# Patient Record
Sex: FEMALE | Race: WHITE | HISPANIC OR LATINO | Employment: FULL TIME | URBAN - METROPOLITAN AREA
[De-identification: names, ages, dates, MRNs, and addresses within clinical notes are randomized per-mention and may not be internally consistent; named-entity substitution may affect disease eponyms.]

---
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
## 2023-01-15 ENCOUNTER — HOSPITAL ENCOUNTER (EMERGENCY)
Facility: HOSPITAL | Age: 18
Discharge: HOME | End: 2023-01-15
Attending: EMERGENCY MEDICINE | Admitting: EMERGENCY MEDICINE
Payer: COMMERCIAL

## 2023-01-15 ENCOUNTER — APPOINTMENT (OUTPATIENT)
Dept: RADIOLOGY | Facility: HOSPITAL | Age: 18
End: 2023-01-15
Attending: EMERGENCY MEDICINE
Payer: COMMERCIAL

## 2023-01-15 VITALS
TEMPERATURE: 99.8 F | RESPIRATION RATE: 16 BRPM | HEIGHT: 64 IN | WEIGHT: 210 LBS | BODY MASS INDEX: 35.85 KG/M2 | OXYGEN SATURATION: 97 % | DIASTOLIC BLOOD PRESSURE: 70 MMHG | SYSTOLIC BLOOD PRESSURE: 136 MMHG | HEART RATE: 100 BPM

## 2023-01-15 DIAGNOSIS — Z77.9 EXPOSURE TO RESPIRATORY IRRITANT: ICD-10-CM

## 2023-01-15 DIAGNOSIS — Z77.098 ACCIDENTAL EXPOSURE TO BLEACH: ICD-10-CM

## 2023-01-15 PROBLEM — K76.0 NONALCOHOLIC FATTY LIVER DISEASE: Status: ACTIVE | Noted: 2021-09-03

## 2023-01-15 PROBLEM — K76.0 STEATOSIS OF LIVER: Status: ACTIVE | Noted: 2023-01-15

## 2023-01-15 PROCEDURE — 999N000157 HC STATISTIC RCP TIME EA 10 MIN

## 2023-01-15 PROCEDURE — 71046 X-RAY EXAM CHEST 2 VIEWS: CPT

## 2023-01-15 PROCEDURE — 94640 AIRWAY INHALATION TREATMENT: CPT

## 2023-01-15 PROCEDURE — 99283 EMERGENCY DEPT VISIT LOW MDM: CPT | Mod: 25

## 2023-01-15 PROCEDURE — 250N000009 HC RX 250: Performed by: EMERGENCY MEDICINE

## 2023-01-15 RX ORDER — IPRATROPIUM BROMIDE AND ALBUTEROL SULFATE 2.5; .5 MG/3ML; MG/3ML
3 SOLUTION RESPIRATORY (INHALATION) ONCE
Status: COMPLETED | OUTPATIENT
Start: 2023-01-15 | End: 2023-01-15

## 2023-01-15 RX ORDER — ALBUTEROL SULFATE 0.83 MG/ML
2.5 SOLUTION RESPIRATORY (INHALATION) EVERY 6 HOURS PRN
Qty: 90 ML | Refills: 0 | Status: SHIPPED | OUTPATIENT
Start: 2023-01-15

## 2023-01-15 RX ADMIN — IPRATROPIUM BROMIDE AND ALBUTEROL SULFATE 3 ML: .5; 3 SOLUTION RESPIRATORY (INHALATION) at 16:51

## 2023-01-15 ASSESSMENT — ACTIVITIES OF DAILY LIVING (ADL): ADLS_ACUITY_SCORE: 35

## 2023-01-15 NOTE — ED TRIAGE NOTES
The pt states that her sister was cleaning with Colorox Bleach and she feels she may have inhalation injury. The pt feels lightheaded and a sore throat.

## 2023-01-15 NOTE — DISCHARGE INSTRUCTIONS
You were seen in the Emergency Department today for evaluation of a sore throat and some discomfort related to bleach exposure.  Your imaging studies showed no significant cause of your symptoms. Follow up with your primary care physician to ensure resolution of symptoms. Return if you have new or worsening symptoms.   You were prescribed an inhaler to use as needed.

## 2023-01-15 NOTE — ED NOTES
Pt comes in today with c/o of her throat burning after her sister started cleaning with bleach. She said that her sister started to clean and used a lot of bleach. When she started to smell it , she noticed it was burning her nose and throat. She is not coughing. Respirations are regular and non labored. She is swallowing w/o difficulty

## 2023-01-15 NOTE — Clinical Note
Ludmila Davalos was seen and treated in our emergency department on 1/15/2023.         Sincerely,     [facility] Emergency Department

## 2023-01-15 NOTE — ED PROVIDER NOTES
EMERGENCY DEPARTMENT ENCOUNTER      NAME: Ludmila Davalos  AGE: 17 year old female  YOB: 2005  MRN: [id number]  EVALUATION DATE & TIME: 1/15/2023  3:56 PM    PCP: Feliz Ramirez [facility]    ED PROVIDER: Hillary Singleton M.D.      Chief Complaint   Patient presents with     Toxic Inhalation     FINAL IMPRESSION:  1. Accidental exposure to bleach    2. Exposure to respiratory irritant      ED COURSE & MEDICAL DECISION MAKING:    Pertinent Labs & Imaging studies reviewed. (See chart for details)     3:58 PM I met with the patient, obtained history, performed an initial exam, and discussed options and plan for diagnostics and treatment here in the ED.  5:38 PM Checked in on and updated patient.    ED Course as of 01/15/23 1748   Sun Sung 15, 2023   1602 Patient is a 17-year-old female who comes in today feeling like her throat is little tight and just not feeling great.  She complains of feeling little lightheaded and a little bit of a sore throat as well.  This occurred yesterday when her sister was cleaning with Clorox bleach.  She smells like Clorox bleach when I walk in the room.  She has clear lung sounds.  There is no swelling to her throat.  She not drooling.  She has no trismus.  She does not look in any acute distress.  I told her that it is more of an irritant and not truly toxic.  We will get a chest x-ray and try DuoNeb and see how she feels.  I do not think other work-up is needed at this time.  She could have a little bit of an irritant pneumonitis.  I discussed plan with patient and she is in agreement.   1730 Chest x-ray unremarkable.  I will discuss with the patient we can work on getting her discharged home.   1745 I discussed results and plan for discharge with the patient.  She is in agreement.  She felt better with the neb treatment and did feel like an inhaler might be a good at home so I will prescribe that for her.       Medical Decision  Making    History:    Supplemental history from: N/A    External Record(s) reviewed: Documented in HPI, if applicable.    Work Up:    Chart documentation includes differential considered and any EKGs or imaging independently interpreted by provider.    In additional to work up documented, I considered the following work up: See chart documentation, if applicable.    External consultation:    Discussion of management with another provider: See chart documentation, if applicable    Complicating factors:    Care impacted by chronic illness: N/A    Care affected by social determinants of health: N/A    Disposition considerations: Discharge. I prescribed additional prescription strength medication(s) as charted. N/A.    At the conclusion of the encounter I discussed  the results of all of the tests and the disposition with patient.   All questions were answered.  The patient acknowledged understanding and was involved in the decision making regarding the overall care plan.      I discussed with patient the utility, limitations and findings of the exam/interventions/studies done during this visit as well as the list of differential diagnosis and symptoms to monitor/return to ER for.  Additional verbal discharge instructions were provided.     MEDICATIONS GIVEN IN THE EMERGENCY:  Medications   ipratropium - albuterol 0.5 mg/2.5 mg/3 mL (DUONEB) neb solution 3 mL (3 mLs Nebulization Given 1/15/23 4310)       NEW PRESCRIPTIONS STARTED AT TODAY'S ER VISIT  New Prescriptions    ALBUTEROL (PROVENTIL) (2.5 MG/3ML) 0.083% NEB SOLUTION    Take 1 vial (2.5 mg) by nebulization every 6 hours as needed for shortness of breath, wheezing or cough          =================================================================    HPI    Triage Note: The pt states that her sister was cleaning with Colorox Bleach and she feels she may have inhalation injury. The pt feels lightheaded and a sore throat.     Patient information was obtained from:  "patient     Use of : N/A         Ludmila Davalos is a 17 year old female who presents by walk in for evaluation of toxic inhalation.    Yesterday, the patient's sister was cleaning with Colorox bleach. Patient says that she thinks her sister used too much bleach, and patient has inhaled the bleach yesterday. Since then, she has been having shortness of breath, headaches, and feels like her throat is closing. Reports that her symptoms have gotten worse since yesterday. She also endorses chest pain that feels like \"pinching in my chest.\" She denies a history of asthma or other pulmonary issues. No chance of pregnancy.     The patient denies any other complaints at this time.     PAST MEDICAL HISTORY:  History reviewed. No pertinent past medical history.    PAST SURGICAL HISTORY:  History reviewed. No pertinent surgical history.    CURRENT MEDICATIONS:    No current facility-administered medications for this encounter.    Current Outpatient Medications:      albuterol (PROVENTIL) (2.5 MG/3ML) 0.083% neb solution, Take 1 vial (2.5 mg) by nebulization every 6 hours as needed for shortness of breath, wheezing or cough, Disp: 90 mL, Rfl: 0     acetaminophen-codeine (TYLENOL #3) 300-30 MG tablet, TAKE 1 TABLET BY MOUTH EVERY 4 TO 6 HOURS AS NEEDED FOR PAIN, Disp: , Rfl:      famotidine (PEPCID) 10 MG tablet, Take 1 tablet (10 mg) by mouth 2 times daily, Disp: 20 tablet, Rfl: 0     ibuprofen (ADVIL/MOTRIN) 600 MG tablet, TAKE 1 TABLET BY MOUTH EVERY 6 HOURS AS NEEDED FOR PAIN, Disp: , Rfl:      meclizine (ANTIVERT) 25 MG tablet, Take 1 tablet (25 mg) by mouth 3 times daily as needed for dizziness, Disp: 20 tablet, Rfl: 0    ALLERGIES:  No Known Allergies    FAMILY HISTORY:  History reviewed. No pertinent family history.    SOCIAL HISTORY:   Social History     Socioeconomic History     Marital status: Single       PHYSICAL EXAM    VITAL SIGNS: /70 (BP Location: Left arm, Cuff Size: Adult Regular)   " "Pulse 100   Temp 99.8  F (37.7  C) (Temporal)   Resp 16   Ht 1.626 m (5' 4\")   Wt 95.3 kg (210 lb)   SpO2 97%   BMI 36.05 kg/m     GENERAL: Patient is awake and alert and answering questions appropriately.  She is ambulatory without difficulty.  She appears in no significant distress.  She is not uncomfortable appearing.  She does smell of bleach.  She has no swelling in her mouth or throat.  She has no trismus.  She has no drooling.  SPEECH: Clear and easy to understand speech without any slurring.  Normal volume and nolan.  PULMONARY: No respiratory distress, Lungs clear to auscultation bilaterally no wheezing bilaterally.  CARDIOVASCULAR: Regular rate and rhythm, Distal pulses present and normal.  EXTREMITIES: No lower extremity edema  PSYCH: Normal mood and affect     LAB:  All pertinent labs reviewed and interpreted.  Results for orders placed or performed during the hospital encounter of 01/15/23   Chest XR,  PA & LAT    Impression    IMPRESSION: Lung volumes are low. Lungs are clear. No pleural effusions or pneumothorax. Normal cardiac silhouette.       RADIOLOGY:  Chest XR,  PA & LAT   Final Result   IMPRESSION: Lung volumes are low. Lungs are clear. No pleural effusions or pneumothorax. Normal cardiac silhouette.              I, Zaida Nunez, am serving as a scribe to document services personally performed by Dr. Singleton based on my observation and the provider's statements to me. I, Hillary Singleton MD attest that Zaida Nunez is acting in a scribe capacity, has observed my performance of the services and has documented them in accordance with my direction.    Hillary Singleton M.D.  Emergency Medicine  [facility] EMERGENCY DEPARTMENT  [employer] [facility] [facility] [address]  [phone]  Dept: [phone]     Hillary Singleton MD  01/15/23 1749    "

## 2023-01-16 NOTE — ED NOTES
ER DISCHARGE NOTE:   Ludmila Davalos MRN: [id number]      Ludmila Davalos is discharged from the emergency room.    (Z77.098) Accidental exposure to bleach  Comment:   Plan:     (Z77.9) Exposure to respiratory irritant  Comment:   Plan:         Ludmila Davalos discharged via self.    Verbalized understanding of discharge instructions.   Prescriptions: none.    AVS in hand.

## 2023-09-16 ENCOUNTER — HOSPITAL ENCOUNTER (EMERGENCY)
Facility: HOSPITAL | Age: 18
Discharge: HOME | End: 2023-09-16
Attending: STUDENT IN AN ORGANIZED HEALTH CARE EDUCATION/TRAINING PROGRAM | Admitting: STUDENT IN AN ORGANIZED HEALTH CARE EDUCATION/TRAINING PROGRAM
Payer: COMMERCIAL

## 2023-09-16 VITALS
OXYGEN SATURATION: 98 % | DIASTOLIC BLOOD PRESSURE: 63 MMHG | TEMPERATURE: 97.7 F | SYSTOLIC BLOOD PRESSURE: 127 MMHG | RESPIRATION RATE: 17 BRPM | HEART RATE: 92 BPM

## 2023-09-16 DIAGNOSIS — I80.02 THROMBOPHLEBITIS OF SUPERFICIAL VEINS OF LEFT LOWER EXTREMITY: ICD-10-CM

## 2023-09-16 PROCEDURE — 99282 EMERGENCY DEPT VISIT SF MDM: CPT

## 2023-09-16 RX ORDER — IBUPROFEN 600 MG/1
600 TABLET, FILM COATED ORAL EVERY 6 HOURS PRN
Qty: 40 TABLET | Refills: 0 | Status: SHIPPED | OUTPATIENT
Start: 2023-09-16 | End: 2023-09-26

## 2023-09-16 ASSESSMENT — ACTIVITIES OF DAILY LIVING (ADL): ADLS_ACUITY_SCORE: 33

## 2023-09-16 NOTE — Clinical Note
Ludmila Davalos was seen and treated in our emergency department on 9/16/2023.  She may return to work on 09/20/2023.       If you have any questions or concerns, please don't hesitate to call.      Mehrdad Ceballos MD

## 2023-09-17 NOTE — ED PROVIDER NOTES
EMERGENCY DEPARTMENT ENCOUNTER      NAME: Ludmila Davalos  AGE: 18 year old female  YOB: 2005  MRN: [id number]  EVALUATION DATE & TIME: 9/16/2023 10:20 PM    PCP: Feliz Ramirez [facility]    ED PROVIDER: Mehrdad Ceballos MD      Chief Complaint   Patient presents with    Foot Pain         FINAL IMPRESSION:  No diagnosis found.      ED COURSE & MEDICAL DECISION MAKING:    Pertinent Labs & Imaging studies reviewed. (See chart for details)  18 year old female presents to the Emergency Department for evaluation of left foot pain and swelling.    Patient 18-year-old female with no significant past medical history presenting to the emergency department for evaluation of left-sided foot pain that started last night.  Patient states she was sitting on the couch when she began to notice some swelling and pain flare on the top of her left foot.  No inciting trauma or injury.  Feels like there is a small swollen tender area on the top of her left foot just below her ankle.  Has not had any fevers.  No swelling of the ankle itself or swelling of the calf or lower leg otherwise.  She has never had symptoms like this before.  No history of prior blood clots.  She is on hormonal birth control with OCPs.  Denies any chest pain or shortness of breath.  No other joint pain or swelling.  No skin rashes.    On exam patient is well-appearing in no acute distress.  Heart is regular rate and rhythm.  Lungs are clear without wheezes or rails.  No significant lower extremity edema.  No tenderness or erythema of the left calf.  She does have a 1 to 2 cm palpable cord on the dorsum of the left foot consistent with superficial thrombophlebitis.  No overlying skin changes.  2+ DP and PT pulse.  Neurovascular intact left lower extremity.    Patient presents with small tender area on the dorsum of the left foot consistent with superficial thrombophlebitis.  Low suspicion for DVT.  We will treat conservatively with NSAIDs  compression and elevation.  I recommend follow-up with  primary care doctor in the next 1 to 2 weeks to ensure improvement in symptoms.  If she has any worsening swelling of the lower extremity, chest pain or shortness of breath she should return to the emergency department promptly.  Additionally if she has recurrence of thrombophlebitis she may need to be reevaluated for different form of contraceptive.  Patient expressed understanding discharge stable condition.       11:10 PM I met and evaluated the patient.   11:15 PM We discussed the plan for discharge and the patient is agreeable. Reviewed supportive cares, symptomatic treatment, outpatient follow up, and reasons to return to the Emergency Department. Patient to be discharged by ED RN.    Medical Decision Making    History:  Supplemental history from: Documented in chart, if applicable  External Record(s) reviewed: Documented in chart, if applicable.    Work Up:  Chart documentation includes differential considered and any EKGs or imaging independently interpreted by provider, where specified.  In additional to work up documented, I considered the following work up: Documented in chart, if applicable.    External consultation:  Discussion of management with another provider: Documented in chart, if applicable    Complicating factors:  Care impacted by chronic illness: Other: Fatty liver disease  Care affected by social determinants of health: N/A    Disposition considerations: Discharge. I prescribed additional prescription strength medication(s) as charted. See documentation for any additional details.       At the conclusion of the encounter I discussed the results of all of the tests and the disposition. The questions were answered. The patient or family acknowledged understanding and was agreeable with the care plan.       MEDICATIONS GIVEN IN THE EMERGENCY:  Medications - No data to display    NEW PRESCRIPTIONS STARTED AT TODAY'S ER VISIT  New  Prescriptions    No medications on file          =================================================================    \[facility] [place] [facility]\""    Patient information was obtained from: patient    Use of : N/A      Ludmila Davalos is a 18 year old female with no pertinent history who presents to this ED by walk-in for evaluation of left foot pain.    Patient reports pain and swelling to the top of her left foot that started suddenly at about 10:00 PM last night while she was sitting down. She says there her mobility is limited secondary to the pain. She took advil with moderate short term relief and notes a history of taking birth control tablets. Otherwise, patein denies any fever, changes to the skin, other pain, other medical problems, recent IV placement, or a history of blood clots or anything similar to this. No other complaints at this time.      REVIEW OF SYSTEMS   Refer to the \[facility] [place] [facility]\""    PAST MEDICAL HISTORY:  No past medical history on file.    PAST SURGICAL HISTORY:  No past surgical history on file.        CURRENT MEDICATIONS:    acetaminophen-codeine (TYLENOL #3) 300-30 MG tablet  albuterol (PROVENTIL) (2.5 MG/3ML) 0.083% neb solution  famotidine (PEPCID) 10 MG tablet  ibuprofen (ADVIL/MOTRIN) 600 MG tablet  meclizine (ANTIVERT) 25 MG tablet        ALLERGIES:  No Known Allergies    FAMILY HISTORY:  No family history on file.    SOCIAL HISTORY:   Social History     Socioeconomic History    Marital status: Single       VITALS:  /63   Pulse 92   Temp 97.7  F (36.5  C) (Temporal)   Resp 17   SpO2 98%     PHYSICAL EXAM    Constitutional: Well developed, Well nourished, NAD,  HENT: Normocephalic, Atraumatic,  mucous membranes moist,   Neck- trachea midline, No stridor.    Eyes:EOMI, Conjunctiva normal, No discharge.   Respiratory: Normal breath sounds, No respiratory distress, No wheezing.    Cardiovascular: Normal heart rate, Regular rhythm,  No murmurs,   Abdominal: Soft, No tenderness, No rebound or  guarding.     Musculoskeletal: no deformity or malalignment. 1-2 cm palpable cord with tenderness on lateral, dorsal aspect of left foot. 2+ DP and PT to lateral, dorsal aspect of left foot. Left foot sensation intact with no bony tenderness or tenderness or swelling to surrounding areas.  Integument: Warm, Dry, No erythema, No rash.   Neurologic: Alert & oriented x 3   Psychiatric: Affect normal, Cooperative.      LAB:  All pertinent labs reviewed and interpreted.       RADIOLOGY:  Reviewed all pertinent imaging. Please see official radiology report.  No orders to display                  I, Savanna Mariama, am serving as a scribe to document services personally performed by Mehrdad Ceballos MD based on my observation and the provider's statements to me. I, Mehrdad Ceballos MD, attest that Savanna Leslie is acting in a scribe capacity, has observed my performance of the services and has documented them in accordance with my direction.    Mehrdad Ceballos MD  [facility] EMERGENCY DEPARTMENT  [address] [place] [id number] [phone]      Mehrdad Ceballos MD  09/16/23 1505

## 2023-09-17 NOTE — ED TRIAGE NOTES
Patient her for left foot pain states that she feels as if a vein on top of the foot is flamed and that it is making it difficult to even bend her foot, sx began two days ago.

## 2023-09-17 NOTE — ED NOTES
Pt presents NAD. SKIN: NWD.   HEENT: normocephalic, PERRL, clear x 3.   CHEST: symmetrical rise, CEBBS, no CP or SOB.  ABD: NTND, no N&V or diarrhea.  EXT: LOVING x 4, pt c/o left foot pain, tenderness, and swelling.  Rest of exam unremarkable.

## 2023-12-26 ENCOUNTER — HOSPITAL ENCOUNTER (EMERGENCY)
Facility: HOSPITAL | Age: 18
Discharge: HOME | End: 2023-12-26
Payer: COMMERCIAL

## 2024-02-26 ENCOUNTER — TRANSCRIBE ORDERS (OUTPATIENT)
Dept: OTHER | Age: 19
End: 2024-02-26

## 2024-02-26 DIAGNOSIS — E28.2 PCOS (POLYCYSTIC OVARIAN SYNDROME): Primary | ICD-10-CM

## 2024-03-09 ENCOUNTER — HOSPITAL ENCOUNTER (EMERGENCY)
Facility: HOSPITAL | Age: 19
Discharge: HOME | End: 2024-03-09

## 2024-03-09 VITALS
BODY MASS INDEX: 36.61 KG/M2 | DIASTOLIC BLOOD PRESSURE: 77 MMHG | OXYGEN SATURATION: 94 % | WEIGHT: 213.3 LBS | RESPIRATION RATE: 21 BRPM | TEMPERATURE: 98 F | HEART RATE: 109 BPM | SYSTOLIC BLOOD PRESSURE: 132 MMHG

## 2024-03-09 DIAGNOSIS — B97.89 VIRAL RESPIRATORY ILLNESS: ICD-10-CM

## 2024-03-09 DIAGNOSIS — J98.8 VIRAL RESPIRATORY ILLNESS: ICD-10-CM

## 2024-03-09 LAB
FLUAV RNA SPEC QL NAA+PROBE: NEGATIVE
FLUBV RNA RESP QL NAA+PROBE: NEGATIVE
GROUP A STREP BY PCR: NOT DETECTED
RSV RNA SPEC NAA+PROBE: NEGATIVE
SARS-COV-2 RNA RESP QL NAA+PROBE: NEGATIVE

## 2024-03-09 PROCEDURE — 87637 SARSCOV2&INF A&B&RSV AMP PRB: CPT | Performed by: STUDENT IN AN ORGANIZED HEALTH CARE EDUCATION/TRAINING PROGRAM

## 2024-03-09 PROCEDURE — 99283 EMERGENCY DEPT VISIT LOW MDM: CPT

## 2024-03-09 PROCEDURE — 87651 STREP A DNA AMP PROBE: CPT | Performed by: STUDENT IN AN ORGANIZED HEALTH CARE EDUCATION/TRAINING PROGRAM

## 2024-03-09 ASSESSMENT — ACTIVITIES OF DAILY LIVING (ADL): ADLS_ACUITY_SCORE: 35

## 2024-03-09 ASSESSMENT — COLUMBIA-SUICIDE SEVERITY RATING SCALE - C-SSRS
1. IN THE PAST MONTH, HAVE YOU WISHED YOU WERE DEAD OR WISHED YOU COULD GO TO SLEEP AND NOT WAKE UP?: NO
2. HAVE YOU ACTUALLY HAD ANY THOUGHTS OF KILLING YOURSELF IN THE PAST MONTH?: NO
6. HAVE YOU EVER DONE ANYTHING, STARTED TO DO ANYTHING, OR PREPARED TO DO ANYTHING TO END YOUR LIFE?: NO

## 2024-03-09 NOTE — ED PROVIDER NOTES
Emergency Department Encounter  [facility] EMERGENCY DEPARTMENT  Ludmila Davalos   AGE: 18 year old SEX: female  YOB: 2005  MRN: [id number]      EVALUATION DATE & TIME: No admission date for patient encounter. ; PCP: [facility], [facility]   ED PROVIDER: Inge Butts PA-C    CHIEF COMPLAINT: Otalgia, Cough, and Pharyngitis      MEDICAL DECISION MAKING   Ludmila Davalos is an otherwise healthy 18-year-old female who presents to the ED for evaluation of ear pain, cough, and sore throat for the last 3 days.  Last dose of acetaminophen this morning at 6 AM.  No nausea, vomiting, or diarrhea.  No shortness of breath or chest pain.    Vitals reviewed and hemodynamically stable, afebrile at 98  F without the use of fever reducing medications.  On exam, patient is well-appearing and nontoxic.  No signs of respiratory distress and lungs are CTAB.  Oropharynx unremarkable without tonsillar exudates.  Uvula midline.  TMs visualized bilaterally without erythema or bulging    Patient presentation is most consistent with viral illness.  Viral testing was negative for COVID-19, RSV, and influenza. I deferred CXR at this time as patient particularly low risk for pneumonia. There are no warning signs of systemic infection (fevers, tachypnea) to suggest pneumonia, and lung sounds are clear on exam. No barking cough, hoarseness, or noisy breathing to indicate croup or pertussis.  No photophobia or neck stiffness/pain to suggest meningitis. Oropharynx is without erythema and tonsillar exudates, with no uvula deviation to suggest streptococcal pharyngitis and peritonsillar abscess (PTA). No red or bulging TM to suggest AOM. I considered, but think unlikely, dangerous causes of this patient s symptoms to include acute coronary syndrome, congestive heart failure, COPD exacerbations, or pneumothorax.     Patient is hemodynamically stable and well-appearing and workup has  been reassuring, plan to discharge home.  Patient has a clear understanding of the discharge diagnosis, written discharge instructions, and planned follow-up with clear instructions to return to the emergency department if the condition worsens.     Medical Decision Making  Obtained supplemental history:Supplemental history obtained?: Documented in chart and Family Member/Significant Other  Reviewed external records: External records reviewed?: No  Care impacted by chronic illness:N/A  Care significantly affected by social determinants of health:N/A  Did you consider but not order tests?: Work up considered but not performed and documented in chart, if applicable  Did you interpret images independently?: Independent interpretation of ECG and images noted in documentation, when applicable.  Consultation discussion with other provider:Did you involve another provider (consultant, , pharmacy, etc.)?: No  Discharge. No recommendations on prescription strength medication(s). I considered admission, but ultimately discharged patient hemodynamically stable and well-appearing, nonseptic.    FINAL IMPRESSION       ICD-10-CM    1. Viral respiratory illness  J98.8     B97.89           ED COURSE   3:30 PM I met and introduced myself to the patient. I gathered initial history and performed my physical exam. We discussed plan for initial workup.   4:19 PM I rechecked the patient and discussed results, discharge, follow up, and reasons to return to the ED.     MEDICATIONS GIVEN IN THE EMERGENCY DEPARTMENT:   Medications - No data to display   NEW PRESCRIPTIONS STARTED AT TODAY'S ED VISIT:  New Prescriptions    No medications on file       =================================================================     HISTORY OF PRESENT ILLNESS   Patient information was obtained from: patient, mother   Use of Intrepreter: N/A     Ludmila Davalos is an otherwise healthy 18-year-old female who presents to the ED by private vehicle  with mother for evaluation of ear pain, cough, and sore throat.  Symptoms have been present for 3 days.  No recent travel or known sick contacts.  Patient endorses a fever at home and has been taking acetaminophen, last dose this morning at 6 AM.  Tolerating oral intake without nausea, vomiting, or diarrhea.  No shortness of breath, difficulty breathing, difficulty swallowing or breathing, or chest pain.      MEDICAL HISTORY     No past medical history on file.    No past surgical history on file.    No family history on file.         acetaminophen-codeine (TYLENOL #3) 300-30 MG tablet  albuterol (PROVENTIL) (2.5 MG/3ML) 0.083% neb solution  famotidine (PEPCID) 10 MG tablet  meclizine (ANTIVERT) 25 MG tablet        PHYSICAL EXAM   VITALS:  Patient Vitals for the past 24 hrs:   BP Temp Temp src Pulse Resp SpO2 Weight   03/09/24 1555 -- 98  F (36.7  C) Oral -- -- -- --   03/09/24 1440 (!) 154/85 99.5  F (37.5  C) Oral 111 18 98 % 96.8 kg (213 lb 4.8 oz)     Physical Exam  Vitals and nursing note reviewed.   Constitutional:       General: She is not in acute distress.     Appearance: She is not ill-appearing, toxic-appearing or diaphoretic.   HENT:      Right Ear: Tympanic membrane, ear canal and external ear normal.      Left Ear: Tympanic membrane, ear canal and external ear normal.      Nose: Congestion and rhinorrhea present. Rhinorrhea is clear.      Mouth/Throat:      Mouth: Mucous membranes are moist. No oral lesions or angioedema.      Dentition: Normal dentition.      Pharynx: Oropharynx is clear. Uvula midline. No pharyngeal swelling, oropharyngeal exudate, posterior oropharyngeal erythema or uvula swelling.      Tonsils: No tonsillar exudate or tonsillar abscesses. 0 on the right. 0 on the left.   Cardiovascular:      Rate and Rhythm: Normal rate and regular rhythm.      Heart sounds: S1 normal and S2 normal.   Pulmonary:      Effort: Pulmonary effort is normal.      Breath sounds: No decreased breath  sounds, wheezing, rhonchi or rales.   Abdominal:      General: There is no distension.   Musculoskeletal:      Cervical back: Full passive range of motion without pain.      Right lower leg: No edema.      Left lower leg: No edema.   Skin:     General: Skin is warm.      Capillary Refill: Capillary refill takes less than 2 seconds.      Findings: No rash.   Neurological:      General: No focal deficit present.      Mental Status: She is alert and oriented to person, place, and time. Mental status is at baseline.        LABS AND IMAGING   All pertinent labs reviewed and interpreted  Labs Ordered and Resulted from Time of ED Arrival to Time of ED Departure   INFLUENZA A/B, RSV, & SARS-COV2 PCR - Normal       Result Value    Influenza A PCR Negative      Influenza B PCR Negative      RSV PCR Negative      SARS CoV2 PCR Negative     GROUP A STREPTOCOCCUS PCR THROAT SWAB - Normal    Group A strep by PCR Not Detected         No orders to display     Inge Butts PA-C   Emergency Medicine   [facility] EMERGENCY DEPARTMENT  [address] [place] [id number]  [phone]  Dept: [phone]      Inge Butts PA-C  03/09/24 7006

## 2024-03-09 NOTE — DISCHARGE INSTRUCTIONS
Viral testing was negative for COVID-19, RSV, and influenza.  Strep throat testing was negative. The exact cause for your symptoms are unclear, but based on your evaluation today, they do not appear to be due to a cause requiring emergent intervention at this time.    These medicines might help with your symptoms but they can't cure you, or help you get well faster.  Symptom: Fever, headache, muscle aches   Pain relievers - temporarily reduce fever and provide relief from aches and pains - Acetaminophen (Tylenol )  - Non-Steroidal Anti-Inflammatory Drugs (NSAIDs) such as Ibuprofen (Advil ) or Naproxen (Aleve )     Symptom: Non-productive cough (not coughing up phlegm)   Cough suppressant -  reduces cough by blocking cough reflex - Dextromethorphan (Robitussin  DM or Delsym )     Symptom: Productive cough (coughing up phlegm)   Cough suppressant -  reduces cough by blocking cough reflex - Dextromethorphan (Robitussin  DM or Delsym )   Expectorant - thin mucus in the lungs to help clear airways - Guaifenesin (Mucinex  or Robitussin )     Symptom: Nasal congestion, stuffiness, sneezing   Saline nasal spray - moisturizes and clears nasal passages   Nasal Saline Irrigation (Neti Pot ) - flushes out nasal passages   Nasal steroid sprays - reduce the inflammation of the lining of the nose and throat - Fluticasone (Flonase )  - Triamcinolone (Nasacort )   Decongestants - reduce swelling and stuffiness in the nose - Pseudoephedrine (Sudafed ) - oral medication purchased from behind the pharmacy counter  - Oxymetazoline nasal spray (Afrin ); limit to 3-4 days maximum   Antihistamines: block a chemical that makes your nose fill up and run - Diphenhydramine (Benadryl ), which will likely cause drowsiness   - Loratadine (Claritin , Alavert ) or Cetirizine (Zyrtec ), which are not as likely to cause drowsiness     Symptom: Sore throat   Warm salt water gargles (1/4 tsp. salt per 8 oz. of water); and eat soft, bland foods   Pain  relievers - temporarily reduce fever and provide relief from aches and pains - Acetaminophen (Tylenol )  - Non-Steroidal Anti-Inflammatory Drugs (NSAIDs) such as Ibuprofen (Advil ) or Naproxen (Aleve )   Numbing throat sprays - Phenol, Benzocaine, Menthol (Chloraseptic , Vicks VapoCOOL )   Throat lozenges, hard candy or popsicles       Please refer to the attachments provided for more information on how to care for yourself at home.    FOLLOW UP  Please schedule an appointment with any primary care provider within 5 days for emergency department follow up.    Call 911 anytime you think you may need emergency care. For example, call if:    You have severe trouble breathing.     You passed out (lost consciousness).   Call your doctor now or seek immediate medical care if:    You seem to be getting much sicker.     You have a new or higher fever.     You have a severe headache.     You have a stiff neck.     You have blood in your stools.     You have new belly pain, or your pain gets worse.     You have a new rash.     You are confused or disoriented.     You have trouble thinking or concentrating.   Watch closely for changes in your health, and be sure to contact your doctor if:    You start to get better and then get worse.     You do not get better as expected.

## 2024-03-09 NOTE — Clinical Note
Ludmila Davalos was seen and treated in our emergency department on 3/9/2024.  She may return to work on 03/11/2024.  May return once fever free for 24 hours without the use of acetaminophen and ibuprofen.     If you have any questions or concerns, please don't hesitate to call.      Inge Butts PA-C

## 2024-03-09 NOTE — ED TRIAGE NOTES
Pt arrives c/o three days worth of cough, sore throat, and left ear pain.      Triage Assessment (Adult)       Row Name 03/09/24 1896          Triage Assessment    Airway WDL WDL        Respiratory WDL    Respiratory WDL X;cough     Cough Frequency infrequent     Cough Type productive        Skin Circulation/Temperature WDL    Skin Circulation/Temperature WDL WDL        Cardiac WDL    Cardiac WDL WDL        Peripheral/Neurovascular WDL    Peripheral Neurovascular WDL WDL        Cognitive/Neuro/Behavioral WDL    Cognitive/Neuro/Behavioral WDL WDL

## 2024-04-10 ENCOUNTER — HOSPITAL ENCOUNTER (EMERGENCY)
Facility: HOSPITAL | Age: 19
Discharge: HOME | End: 2024-04-10
Attending: STUDENT IN AN ORGANIZED HEALTH CARE EDUCATION/TRAINING PROGRAM | Admitting: STUDENT IN AN ORGANIZED HEALTH CARE EDUCATION/TRAINING PROGRAM

## 2024-04-10 VITALS
WEIGHT: 213.3 LBS | HEART RATE: 81 BPM | SYSTOLIC BLOOD PRESSURE: 115 MMHG | HEIGHT: 64 IN | OXYGEN SATURATION: 97 % | BODY MASS INDEX: 36.41 KG/M2 | RESPIRATION RATE: 18 BRPM | DIASTOLIC BLOOD PRESSURE: 60 MMHG | TEMPERATURE: 97.8 F

## 2024-04-10 DIAGNOSIS — H91.90 DECREASED HEARING, UNSPECIFIED LATERALITY: ICD-10-CM

## 2024-04-10 PROCEDURE — 99283 EMERGENCY DEPT VISIT LOW MDM: CPT

## 2024-04-10 RX ORDER — ACETAMINOPHEN 500 MG
500 TABLET ORAL EVERY 6 HOURS PRN
Qty: 28 TABLET | Refills: 0 | Status: SHIPPED | OUTPATIENT
Start: 2024-04-10 | End: 2024-04-17

## 2024-04-10 ASSESSMENT — COLUMBIA-SUICIDE SEVERITY RATING SCALE - C-SSRS
1. IN THE PAST MONTH, HAVE YOU WISHED YOU WERE DEAD OR WISHED YOU COULD GO TO SLEEP AND NOT WAKE UP?: NO
6. HAVE YOU EVER DONE ANYTHING, STARTED TO DO ANYTHING, OR PREPARED TO DO ANYTHING TO END YOUR LIFE?: NO
2. HAVE YOU ACTUALLY HAD ANY THOUGHTS OF KILLING YOURSELF IN THE PAST MONTH?: NO

## 2024-04-10 ASSESSMENT — ACTIVITIES OF DAILY LIVING (ADL): ADLS_ACUITY_SCORE: 35

## 2024-04-10 NOTE — ED TRIAGE NOTES
Pt presents with bilateral ear pain and change in hearing X7 days.     Triage Assessment (Adult)       Row Name 04/10/24 1025          Triage Assessment    Airway WDL WDL        Respiratory WDL    Respiratory WDL WDL        Skin Circulation/Temperature WDL    Skin Circulation/Temperature WDL WDL        Cardiac WDL    Cardiac WDL WDL        Peripheral/Neurovascular WDL    Peripheral Neurovascular WDL WDL        Cognitive/Neuro/Behavioral WDL    Cognitive/Neuro/Behavioral WDL WDL

## 2024-04-10 NOTE — Clinical Note
Ludmila Davalos was seen and treated in our emergency department on 4/10/2024.  She may return to work on 04/15/2024.       If you have any questions or concerns, please don't hesitate to call.      Mehrdad Ceballos MD

## 2024-04-10 NOTE — ED PROVIDER NOTES
EMERGENCY DEPARTMENT ENCOUNTER      NAME: Ludmila Davalos  AGE: 18 year old female  YOB: 2005  MRN: [id number]  EVALUATION DATE & TIME: 4/10/2024 10:26 AM    PCP: [facility]    ED PROVIDER: Fern Orozco PA-C      Chief Complaint   Patient presents with    Otalgia     bilateral         FINAL IMPRESSION:  1. Decreased hearing, unspecified laterality      ED COURSE & MEDICAL DECISION MAKING:    Pertinent Labs & Imaging studies reviewed. (See chart for details)  18 year old female presents to the Emergency Department for evaluation of hearing loss.  On 3/9/2024 patient was seen in the emergency room for ear pain, cough, sore throat viral swabs and a chest x-ray was negative.  Patient was discharged home with a diagnosis of viral illness.  Patient reports that her symptoms improved and was doing well until about 7 days ago when she noticed bilateral decreased hearing in both ears.  She has intermittently experienced sharp electrifying ear pain about 4-5 times a day.  No drainage from the ear.  No other pain.  No cough, congestion, runny nose, fevers, chills, chest pain, shortness of breath, jaw pain.  Vitals signs reviewed and unremarkable.  Afebrile.  On exam patient has muffled hearing (per patient).  Patient is still able to hear bilaterally.  Able to hear fingers rubbing outside her years.  Bilateral TMs are unremarkable.  Bilateral external and mid canals are normal.  No jaw tenderness.  No cervical lymphadenopathy.  Posterior oropharynx is nonerythematous. Neuro intact. Exam is otherwise normal.     Differential diagnosis includes eustachian tube dysfunction, TMJ, labyrinthitis.  I spoke with Dr. Sung from [facility] who will see the patient in clinic this week.  Dr. Sung will order the urgent audiogram and his clinic will contact the patient to let her know when her scheduled audiogram is.  Dr. Sung recommended prescribing Tylenol for her pain.  Phone number was  verified prior to the patient leaving to insure follow up. Patient was educated on the treatment plan.  Patient is resting comfortably and agrees with the treatment plan.  All questions answered.  Patient was discharged in stable condition.  Strict return precautions were provided to the patient.      ED COURSE:   10:40 AM I saw the patient.   10:51 AM I staffed the patient with Dr. Ceballos.  10:59 AM Spoke with ENT, Dr. Sung.  11:14 AM Reevaluated and updated the patient with ENT recommendations. We discussed the plan for discharge and the patient is agreeable. Reviewed supportive cares, symptomatic treatment, outpatient follow up, and reasons to return to the Emergency Department. Patient to be discharged by ED RN.          At the conclusion of the encounter I discussed the results of all of the tests and the disposition. The questions were answered. The patient or family acknowledged understanding and was agreeable with the care plan.     0 minutes of critical care time       Medical Decision Making  Obtained supplemental history:Supplemental history obtained?: No  Reviewed external records: External records reviewed?: Inpatient Record: ED 3/9/2024  Care impacted by chronic illness:N/A  Care significantly affected by social determinants of health:Access to Medical Care  Did you consider but not order tests?: Work up considered but not performed and documented in chart, if applicable  Did you interpret images independently?: Independent interpretation of ECG and images noted in documentation, when applicable.  Consultation discussion with other provider:Did you involve another provider (consultant, MH, pharmacy, etc.)?: I discussed the care with another health care provider, see documentation for details.  Discharge. I prescribed additional prescription strength medication(s) as charted. See documentation for any additional details.      MEDICATIONS GIVEN IN THE EMERGENCY:  Medications - No data to display    NEW  "PRESCRIPTIONS STARTED AT TODAY'S ER VISIT  New Prescriptions    ACETAMINOPHEN (TYLENOL) 500 MG TABLET    Take 1 tablet (500 mg) by mouth every 6 hours as needed for pain or fever          =================================================================    HPI    Patient information was obtained from: patient    Use of : N/A         Ludmila Davalos is a 18 year old female with no pertinent history who presents to this ED via private vehicle for evaluation of ear pain.    Per chart review, she was seen at [facility] ED on 3/9/2024 for ear pain, cough, sore throat. Viral swabs were negative and Chest X ray was clear. She was discharged home with follow up with PCP.    Patient reports about 7 days ago, she developed persistent sudden onset decreased hearing in both her ears and intermittent sharp electrifying ear pain (4-5x daily). She says that her hearing is a 4-5/10 and is very muffled. She otherwise denies associating sore throat, fever, congestion, rhinorrhea, ear drainage, chills, or foreign bodies in the ear. She did have a viral illness about 1 month ago but has since recovered from this. There were no other concerns/complaints at this time.      REVIEW OF SYSTEMS   As per HPI    PAST MEDICAL HISTORY:  History reviewed. No pertinent past medical history.    PAST SURGICAL HISTORY:  History reviewed. No pertinent surgical history.        CURRENT MEDICATIONS:    acetaminophen (TYLENOL) 500 MG tablet  acetaminophen-codeine (TYLENOL #3) 300-30 MG tablet  albuterol (PROVENTIL) (2.5 MG/3ML) 0.083% neb solution  famotidine (PEPCID) 10 MG tablet  meclizine (ANTIVERT) 25 MG tablet        ALLERGIES:  No Known Allergies    FAMILY HISTORY:  History reviewed. No pertinent family history.    SOCIAL HISTORY:   Social History     Socioeconomic History    Marital status: Single       VITALS:  /58   Pulse 74   Temp 97.8  F (36.6  C) (Temporal)   Resp 18   Ht 1.626 m (5' 4\")   Wt 96.8 kg (213 " lb 4.8 oz)   LMP 01/06/2024 (Approximate)   SpO2 96%   BMI 36.61 kg/m      PHYSICAL EXAM    Physical Exam  Vitals and nursing note reviewed.   Constitutional:       General: She is not in acute distress.     Appearance: Normal appearance. She is not ill-appearing, toxic-appearing or diaphoretic.   HENT:      Head: Normocephalic and atraumatic.      Jaw: There is normal jaw occlusion. No trismus, tenderness, swelling, pain on movement or malocclusion.      Right Ear: Tympanic membrane, ear canal and external ear normal. Decreased hearing noted.      Left Ear: Tympanic membrane, ear canal and external ear normal. Decreased hearing noted.      Ears:      Comments: Able to hear fingers rubbing outside the ears.     Nose: Nose normal.      Right Sinus: No maxillary sinus tenderness or frontal sinus tenderness.      Left Sinus: No maxillary sinus tenderness or frontal sinus tenderness.      Mouth/Throat:      Mouth: Mucous membranes are moist.      Tongue: No lesions. Tongue does not deviate from midline.      Palate: No mass and lesions.      Pharynx: Oropharynx is clear. Uvula midline.      Tonsils: No tonsillar exudate or tonsillar abscesses.   Eyes:      Conjunctiva/sclera: Conjunctivae normal.      Pupils: Pupils are equal, round, and reactive to light.   Cardiovascular:      Rate and Rhythm: Normal rate and regular rhythm.      Pulses: Normal pulses.      Heart sounds: Normal heart sounds. No murmur heard.     No friction rub. No gallop.   Pulmonary:      Effort: Pulmonary effort is normal.      Breath sounds: Normal breath sounds. No wheezing or rales.   Abdominal:      Tenderness: There is no abdominal tenderness. There is no guarding or rebound.   Musculoskeletal:      Cervical back: Normal range of motion. No rigidity or crepitus. Normal range of motion.   Skin:     General: Skin is dry.      Capillary Refill: Capillary refill takes less than 2 seconds.      Findings: No rash.   Neurological:      General: No  focal deficit present.      Mental Status: She is alert and oriented to person, place, and time. Mental status is at baseline.      Cranial Nerves: No cranial nerve deficit.   Psychiatric:         Mood and Affect: Mood normal.         Thought Content: Thought content normal.          LAB:  All pertinent labs reviewed and interpreted.  Labs Ordered and Resulted from Time of ED Arrival to Time of ED Departure - No data to display     RADIOLOGY:  Reviewed all pertinent imaging. Please see official radiology report.  No orders to display       EKG:    None        I, Vanessa Godoy, am serving as a scribe to document services personally performed by Fern Orozco PA-C, based on my observation and the provider's statements to me. I, Fern Orozco PA-C, attest that Vanessa Godoy is acting in a scribe capacity, has observed my performance of the services and has documented them in accordance with my direction.    Fern Orozco PA-C  [facility] EMERGENCY DEPARTMENT  [address] [place] [id number]  [phone]     Fern Orozco PA-C  04/10/24 1234

## 2024-04-10 NOTE — DISCHARGE INSTRUCTIONS
Rest. Orally hydrate.  I prescribed you Tylenol for your pain.  Take 1 tablet of Tylenol every 6 hours as needed for your pain.  Do not take more than prescribed.  I spoke with Dr. Sung from ENT who would like you to have a audiogram in order to evaluate your hearing loss.  Dr. Sung will order the test and his clinic will call you in order to let you know when you need to show up for your audiogram.  Return to the ED if new symptoms develop or symptoms worsen.  If you do not hear from from his clinic by this afternoon please call the clinic before it closes to make sure you have a scheduled appointment.

## 2024-04-10 NOTE — ED NOTES
Emergency Department Midlevel Supervisory Note      I had a face to face encounter with this patient seen by the Advanced Practice Provider (FARHAN). I personally made/approved the management plan and take responsibility for the patient management. I personally saw patient and performed a substantive portion of the visit including all aspects of the medical decision making.     ED Course:  10:53 AM Fern Orozco PA-C staffed patient with me. I agree with their assessment and plan of management, and I will see the patient.  11:04 AM  I met with the patient to introduce myself, gather additional history, perform my initial exam, and discuss the plan.     Brief HPI:     Ludmila Davalos is a 18 year old female who presents for evaluation of bilateral otalgia and changes in hearing for 7 days.  Patient states she had a viral illness about a month ago and since recovered from those symptoms.  However about 7 to 14 days ago began noticing a muffled hearing sensation started in the right ear but now is involved both ears.  Has not noticed any ajith ringing in ears or tinnitus but had a brief episode of dizziness this morning that lasted for about 30 minutes and then spontaneously resolved.  She does have some popping sensation on occasion with chewing and opening her mouth in both ears.  No associated visual changes.  No numbness or weakness in the arms or legs.  No difficulty with ambulation.  No neck pain or headache.    I, Misti Raymond, am serving as a scribe to document services personally performed by Mehrdad Ceballos MD based on my observations and the provider's statements to me.   I, Mehrdad Ceballos MD, attest that Misti Raymond was acting in a scribe capacity, has observed my performance of the services and has documented them in accordance with my direction.    Brief Physical Exam:  Constitutional:  Alert, in no acute distress  EYES: Conjunctivae clear  HENT:   TMs appear normal bilaterally, no cerumen  impaction, able to hear fingers being rubbed together behind both ears but no nystagmus appreciated  Respiratory:  Respirations even, unlabored, in no acute respiratory distress  Cardiovascular:  Regular rate and rhythm, good peripheral perfusion  GI: Soft, nondistended, nontender, no palpable masses, no rebound, no guarding   Musculoskeletal:  No edema. No cyanosis. Range of motion major extremities intact.    Integument: Warm, Dry, No erythema, No rash.   Neurologic:  Alert & oriented, no focal deficits noted      MDM:  Patient is a 18-year-old female present to the emergency department with muffled hearing/hearing loss it is present over the past week to 2 weeks.  On exam here she is well-appearing and stable and afebrile.  No cerumen impaction and TMs appear normal bilaterally without any signs of otitis media.  No other neurological findings to make me concerned for acute CVA.  She is able to hear fingers being rubbed together behind both ears but does not note a muffled feeling or sensation.  Initial differential includes possible eustachian tube dysfunction, TMJ or possible labyrinthitis.    FARHAN discussed with on-call ENT and they think this could be related to TMJ.  However they will help facilitate urgent audiology/audiogram follow-up in the next several days and plan to follow-up with the patient.  Otherwise patient is comfortable discharge and was discharged in stable condition.       No diagnosis found.    Labs and Imaging:     I independently interpreted the previous imaging and laboratory study(s):       Procedures:  I was present for the key portions of procedures documented in FARHAN/midlevel note, see midlevel note for further details.    Mehrdad Ceballos MD   [facility] EMERGENCY DEPARTMENT  [address] [place] [id number] [phone]     Mehrdad Ceballos MD  04/10/24 1854

## 2024-04-11 ENCOUNTER — OFFICE VISIT (OUTPATIENT)
Dept: OTOLARYNGOLOGY | Facility: CLINIC | Age: 19
End: 2024-04-11

## 2024-04-11 ENCOUNTER — OFFICE VISIT (OUTPATIENT)
Dept: AUDIOLOGY | Facility: CLINIC | Age: 19
End: 2024-04-11

## 2024-04-11 VITALS
RESPIRATION RATE: 19 BRPM | DIASTOLIC BLOOD PRESSURE: 71 MMHG | HEART RATE: 79 BPM | WEIGHT: 213 LBS | BODY MASS INDEX: 36.37 KG/M2 | HEIGHT: 64 IN | SYSTOLIC BLOOD PRESSURE: 110 MMHG | OXYGEN SATURATION: 100 %

## 2024-04-11 DIAGNOSIS — H65.23 BILATERAL CHRONIC SEROUS OTITIS MEDIA: Primary | ICD-10-CM

## 2024-04-11 DIAGNOSIS — H90.0 CONDUCTIVE HEARING LOSS, BILATERAL: Primary | ICD-10-CM

## 2024-04-11 PROCEDURE — 92550 TYMPANOMETRY & REFLEX THRESH: CPT | Performed by: AUDIOLOGIST

## 2024-04-11 PROCEDURE — 69420 INCISION OF EARDRUM: CPT | Mod: 50 | Performed by: OTOLARYNGOLOGY

## 2024-04-11 PROCEDURE — 92557 COMPREHENSIVE HEARING TEST: CPT | Performed by: AUDIOLOGIST

## 2024-04-11 PROCEDURE — 99202 OFFICE O/P NEW SF 15 MIN: CPT | Mod: 25 | Performed by: OTOLARYNGOLOGY

## 2024-04-11 ASSESSMENT — PAIN SCALES - GENERAL: PAINLEVEL: MILD PAIN (3)

## 2024-04-11 NOTE — LETTER
4/11/2024         RE: Ludmila Davalos  [address] [place] [address]        Dear Colleague,    Thank you for referring your patient, Ludmila Davalos, to the [facility]. Please see a copy of my visit note below.    History of Present Illness - Ludmila Davalos is a very pleasant 18 year old female here as an add on to clinic in follow up from the [facility] ER    She presented with a month long history of severe ear pressure and hearing loss following a URI 6 weeks ago. Her URI symptoms passed, but the ears did not clear.  She has no history of ear disease, no previous ENT surgery at all.      Past Medical History -   Patient Active Problem List   Diagnosis     Nonalcoholic fatty liver disease     Steatosis of liver       Current Medications -   Current Outpatient Medications:      acetaminophen (TYLENOL) 500 MG tablet, Take 1 tablet (500 mg) by mouth every 6 hours as needed for pain or fever, Disp: 28 tablet, Rfl: 0     acetaminophen-codeine (TYLENOL #3) 300-30 MG tablet, TAKE 1 TABLET BY MOUTH EVERY 4 TO 6 HOURS AS NEEDED FOR PAIN, Disp: , Rfl:      albuterol (PROVENTIL) (2.5 MG/3ML) 0.083% neb solution, Take 1 vial (2.5 mg) by nebulization every 6 hours as needed for shortness of breath, wheezing or cough, Disp: 90 mL, Rfl: 0     famotidine (PEPCID) 10 MG tablet, Take 1 tablet (10 mg) by mouth 2 times daily, Disp: 20 tablet, Rfl: 0     meclizine (ANTIVERT) 25 MG tablet, Take 1 tablet (25 mg) by mouth 3 times daily as needed for dizziness, Disp: 20 tablet, Rfl: 0    Allergies - No Known Allergies    Social History -   Social History     Socioeconomic History     Marital status: Single     Spouse name: None     Number of children: None     Years of education: None     Highest education level: None       Family History - History reviewed. No pertinent family history.    Review of Systems - As per HPI and PMHx, otherwise 10+ system review of the head and neck, and  "general constitution is negative.    Physical Exam  B/P: 110/71, T: Data Unavailable, P: 79, R: 19  Vitals: /71   Pulse 79   Resp 19   Ht 1.626 m (5' 4\")   Wt 96.6 kg (213 lb)   LMP 01/06/2024 (Approximate)   SpO2 100%   BMI 36.56 kg/m    BMI= Body mass index is 36.56 kg/m .    General - The patient is well nourished and well developed, and appears to have good nutritional status.  Alert and oriented to person and place, answers questions and cooperates with examination appropriately.   Head and Face - Normocephalic and atraumatic, with no gross asymmetry noted of the contour of the facial features.  The facial nerve is intact, with strong symmetric movements.  Voice and Breathing - The patient was breathing comfortably without the use of accessory muscles. There was no wheezing, stridor, or stertor.  The patients voice was clear and strong, and had appropriate pitch and quality.  Ears - The tympanic membranes are severely retracted bilaterally with an obvious dark yellow effusion.  She was not able to valsalva.    Procedure - Bilateral Myringotomy without tube    Procedure - After discussion of the risks and benefits of myringotomy, informed consent was signed and placed in the chart.  I began with the et side.  I proceeded to position the patient in a semi-supine position in the examination chair.  Using the binocular surgical microscope, I then proceeded to clean the canal of cerumen and squamous debris.  I was able to see the tympanic membrane.  Using a small cotton tipped applicator, I applied a tiny coating of phenol onto the tympanic membrane.  After visualizing a good olivia, I then proceeded to use a myringotomy knife to make a radially oriented incision in the tympanic membrane.  A moderate amount of clear yellow effusion was suctioned away.    I know moved on to the LEFT side. Using the binocular surgical microscope, I then proceeded to clean the canal of cerumen and squamous debris.  I was " able to see the tympanic membrane.  Using a small cotton tipped applicator, I applied a tiny coating of phenol onto the tympanic membrane.  After visualizing a good olivia, I then proceeded to use a myringotomy knife to make a radially oriented incision in the tympanic membrane.  A moderate amount of clear yellow effusion was suctioned away.        A/P - Ludmila Davalos is a 18 year old female  (H65.23) Bilateral chronic serous otitis media  (primary encounter diagnosis)    The patient was instructed on water precautions and signs of infection like purulent or bloody drainage, or pain.  The patient was instructed to call in or return sooner if there was any drainage from the ear or signs of infection.         Again, thank you for allowing me to participate in the care of your patient.        Sincerely,        Kulwant Sung MD

## 2024-04-11 NOTE — PROGRESS NOTES
"History of Present Illness - Ludmila Davalos is a very pleasant 18 year old female here as an add on to clinic in follow up from the [facility] ER    She presented with a month long history of severe ear pressure and hearing loss following a URI 6 weeks ago. Her URI symptoms passed, but the ears did not clear.  She has no history of ear disease, no previous ENT surgery at all.      Past Medical History -   Patient Active Problem List   Diagnosis    Nonalcoholic fatty liver disease    Steatosis of liver       Current Medications -   Current Outpatient Medications:     acetaminophen (TYLENOL) 500 MG tablet, Take 1 tablet (500 mg) by mouth every 6 hours as needed for pain or fever, Disp: 28 tablet, Rfl: 0    acetaminophen-codeine (TYLENOL #3) 300-30 MG tablet, TAKE 1 TABLET BY MOUTH EVERY 4 TO 6 HOURS AS NEEDED FOR PAIN, Disp: , Rfl:     albuterol (PROVENTIL) (2.5 MG/3ML) 0.083% neb solution, Take 1 vial (2.5 mg) by nebulization every 6 hours as needed for shortness of breath, wheezing or cough, Disp: 90 mL, Rfl: 0    famotidine (PEPCID) 10 MG tablet, Take 1 tablet (10 mg) by mouth 2 times daily, Disp: 20 tablet, Rfl: 0    meclizine (ANTIVERT) 25 MG tablet, Take 1 tablet (25 mg) by mouth 3 times daily as needed for dizziness, Disp: 20 tablet, Rfl: 0    Allergies - No Known Allergies    Social History -   Social History     Socioeconomic History    Marital status: Single     Spouse name: None    Number of children: None    Years of education: None    Highest education level: None       Family History - History reviewed. No pertinent family history.    Review of Systems - As per HPI and PMHx, otherwise 10+ system review of the head and neck, and general constitution is negative.    Physical Exam  B/P: 110/71, T: Data Unavailable, P: 79, R: 19  Vitals: /71   Pulse 79   Resp 19   Ht 1.626 m (5' 4\")   Wt 96.6 kg (213 lb)   LMP 01/06/2024 (Approximate)   SpO2 100%   BMI 36.56 kg/m    BMI= Body mass index " is 36.56 kg/m .    General - The patient is well nourished and well developed, and appears to have good nutritional status.  Alert and oriented to person and place, answers questions and cooperates with examination appropriately.   Head and Face - Normocephalic and atraumatic, with no gross asymmetry noted of the contour of the facial features.  The facial nerve is intact, with strong symmetric movements.  Voice and Breathing - The patient was breathing comfortably without the use of accessory muscles. There was no wheezing, stridor, or stertor.  The patients voice was clear and strong, and had appropriate pitch and quality.  Ears - The tympanic membranes are severely retracted bilaterally with an obvious dark yellow effusion.  She was not able to valsalva.    Procedure - Bilateral Myringotomy without tube    Procedure - After discussion of the risks and benefits of myringotomy, informed consent was signed and placed in the chart.  I began with the et side.  I proceeded to position the patient in a semi-supine position in the examination chair.  Using the binocular surgical microscope, I then proceeded to clean the canal of cerumen and squamous debris.  I was able to see the tympanic membrane.  Using a small cotton tipped applicator, I applied a tiny coating of phenol onto the tympanic membrane.  After visualizing a good olivia, I then proceeded to use a myringotomy knife to make a radially oriented incision in the tympanic membrane.  A moderate amount of clear yellow effusion was suctioned away.    I know moved on to the LEFT side. Using the binocular surgical microscope, I then proceeded to clean the canal of cerumen and squamous debris.  I was able to see the tympanic membrane.  Using a small cotton tipped applicator, I applied a tiny coating of phenol onto the tympanic membrane.  After visualizing a good olivia, I then proceeded to use a myringotomy knife to make a radially oriented incision in the tympanic  membrane.  A moderate amount of clear yellow effusion was suctioned away.        A/P - Ludmila Davalos is a 18 year old female  (H65.23) Bilateral chronic serous otitis media  (primary encounter diagnosis)    The patient was instructed on water precautions and signs of infection like purulent or bloody drainage, or pain.  The patient was instructed to call in or return sooner if there was any drainage from the ear or signs of infection.

## 2024-04-11 NOTE — PROGRESS NOTES
AUDIOLOGY REPORT:    Patient was referred from ENT by Oleg Sung MD for audiology evaluation. Patient was seen in the ER 2 weeks ago with severe ear pain, tinnitus and decreased hearing.    Testing:    Otoscopy:   Otoscopic exam indicates ears are clear of cerumen bilaterally     Tympanograms:    RIGHT: restricted eardrum mobility      LEFT:   restricted eardrum mobility     Reflexes (reported by stimulus ear):  RIGHT: Ipsilateral is elevated at frequencies tested  LEFT:   Ipsilateral is absent at frequencies tested      Thresholds:   Pure Tone Thresholds assessed using conventional audiometry with good reliability from 250-8000 Hz bilaterally using insert earphones and circumaural headphones      RIGHT:  borderline-normal and mild sloping to moderate conductive hearing loss     LEFT:    borderline-normal and mild sloping to moderate conductive hearing loss    Speech Reception Threshold:    RIGHT: 35 dB HL    LEFT:   40 dB HL  Speech Reception Thresholds are in good agreement with pure tone thresholds.    Word Recognition Score:     RIGHT: 100% at 75 dB HL using NU-6 recorded word list.    LEFT:   100% at 75 dB HL using NU-6 recorded word list.    Discussed results with the patient.     Patient was returned to ENT for follow up.     Alphonso Graham MA, CCC-A  Licensed Audiologist #[id number]  4/11/2024

## 2024-10-07 ENCOUNTER — HOSPITAL ENCOUNTER (EMERGENCY)
Facility: CLINIC | Age: 19
Discharge: HOME | End: 2024-10-07
Attending: EMERGENCY MEDICINE | Admitting: EMERGENCY MEDICINE

## 2024-10-07 VITALS
RESPIRATION RATE: 20 BRPM | HEIGHT: 64 IN | BODY MASS INDEX: 38.32 KG/M2 | DIASTOLIC BLOOD PRESSURE: 71 MMHG | TEMPERATURE: 97.7 F | SYSTOLIC BLOOD PRESSURE: 129 MMHG | OXYGEN SATURATION: 98 % | HEART RATE: 79 BPM | WEIGHT: 224.43 LBS

## 2024-10-07 DIAGNOSIS — J06.9 UPPER RESPIRATORY TRACT INFECTION, UNSPECIFIED TYPE: ICD-10-CM

## 2024-10-07 DIAGNOSIS — R05.9 COUGH, UNSPECIFIED TYPE: ICD-10-CM

## 2024-10-07 PROCEDURE — 87651 STREP A DNA AMP PROBE: CPT | Performed by: EMERGENCY MEDICINE

## 2024-10-07 PROCEDURE — 87637 SARSCOV2&INF A&B&RSV AMP PRB: CPT | Performed by: EMERGENCY MEDICINE

## 2024-10-07 PROCEDURE — 99283 EMERGENCY DEPT VISIT LOW MDM: CPT

## 2024-10-07 ASSESSMENT — COLUMBIA-SUICIDE SEVERITY RATING SCALE - C-SSRS
6. HAVE YOU EVER DONE ANYTHING, STARTED TO DO ANYTHING, OR PREPARED TO DO ANYTHING TO END YOUR LIFE?: NO
2. HAVE YOU ACTUALLY HAD ANY THOUGHTS OF KILLING YOURSELF IN THE PAST MONTH?: NO
1. IN THE PAST MONTH, HAVE YOU WISHED YOU WERE DEAD OR WISHED YOU COULD GO TO SLEEP AND NOT WAKE UP?: NO

## 2024-10-07 ASSESSMENT — ACTIVITIES OF DAILY LIVING (ADL): ADLS_ACUITY_SCORE: 35

## 2024-10-07 NOTE — ED PROVIDER NOTES
"  Emergency Department Note      History of Present Illness     Chief Complaint   Cold Symptoms      HPI   Ludmila Davalos is a 19 year old female who presents at the emergency department for evaluation of cold symptoms. Patient reports for the past 2 days, she has been experiencing symptoms including nasal congestion, sinus pressure, low-grade fever, cough, and hoarse voice.  She has measured a temperature of 100.3.  Notes her nephew had been sick with cold symptoms.  No COVID testing obtained at home.  Has been using NyQuil, with some improvements in symptoms.  Denies any other complaints or concerns at this time.      Independent Historian   None    Review of External Notes   NOne    Past Medical History     Medical History and Problem List   Fatty liver disease  Steatosis of liver    Medications   Proventil  Pepcid  Antivert    Physical Exam     Patient Vitals for the past 24 hrs:   BP Temp Temp src Pulse Resp SpO2 Height Weight   10/07/24 1757 129/71 97.7  F (36.5  C) Temporal 79 20 98 % 1.626 m (5' 4\") 101.8 kg (224 lb 6.9 oz)     Physical Exam  General:   Well-nourished   Speaking in full sentences  Eyes:   Conjunctiva without injection or scleral icterus   PERRL   EOM full w/out entrapment or proptosis  ENT:   Moist mucous membranes   Posterior oropharynx clear without erythema or exudate   No tonsillar hypertrophy, exudate, asymmetry, nor uvular deviation   No oral lesions   Bilateral TM translucent and gray without air/fluid level or overlying erythema, bony landmarks visualized.   Nares patent   Pinnae normal   No midface swelling, erythema, or asymmetry  Neck:   Full ROM   No stiffness appreciated  Resp:   Lungs CTAB   No crackles, wheezing or audible rubs   Good air movement  CV:    Normal rate, regular rhythm   S1 and S2 present   No murmur, gallop or rub  GI:   BS present   Abdomen soft without distention   Non-tender to light and deep palpation   No guarding or rebound " tenderness  Skin:   Warm, dry, well perfused   No rashes or open wounds on exposed skin  MSK:   Moves all extremities   No focal deformities or swelling  Neuro:   Alert   Answers questions appropriately   Moves all extremities equally   Gait stable  Psych:   Normal affect, normal mood    Diagnostics     Lab Results   Labs Ordered and Resulted from Time of ED Arrival to Time of ED Departure   INFLUENZA A/B, RSV, & SARS-COV2 PCR - Normal       Result Value    Influenza A PCR Negative      Influenza B PCR Negative      RSV PCR Negative      SARS CoV2 PCR Negative     GROUP A STREPTOCOCCUS PCR THROAT SWAB - Normal    Group A strep by PCR Not Detected         Independent Interpretation   None    ED Course      Medications Administered   Medications - No data to display    Procedures   Procedures     Discussion of Management   None    ED Course   ED Course as of 10/07/24 1935   Mon Oct 07, 2024   1932 Patient re-evaluated       Additional Documentation  None    Medical Decision Making / Diagnosis     CMS Diagnoses: None    MIPS       None    Ludmila Davalos is a 19 year old female who presents to the ER for cold symptoms.  Vital signs on presentation reveal no acute abnormlaities.  Differential diagnosis is broad, including but not limited to viral syndrome, influenza, influenza-like-illness, COVID-19, serious bacterial infection (bacteremia, meningitis, UTI/pyelopnephritis, pneumonia), strep pharyngitis deep space neck infection, among others.    At this point, patient's signs and symptoms are consistent with upper respiratory infection. Viral testing obtained, which has returned negative for COVID, influenza and RSV.  Strep testing also returned negative.   Other symptoms are noted as above.  No evidence of acute otitis media, otitis externa, mastoiditis, nor findings that would suggest deeper space neck infection such as peritonsillar abscess, retropharyngeal abscess, nor epiglottitis.  Pneumonia considered,  though pulmonary exam is clear, work of breathing is unremarkable, and patient is without hypoxia.  At present, bacterial meningitis seems very unlikely.  Patient is non-toxic in appearance, interacting with this writer appropriately, with a supple neck exam, and normal vital signs.  Clinically, the risks of LP are felt to outweigh the benefits at this time.  Close followup of primary care physician is indicated in the coming 2-3 days for re-check.  Return to the ER for high fevers > 103 for more than 48 hours more, increasing productive cough, shortness of breath, or confusion.       Disposition   The patient was discharged.     Diagnosis     ICD-10-CM    1. Cough, unspecified type  R05.9       2. Upper respiratory tract infection, unspecified type  J06.9            Scribe Disclosure:  I, Zeny Spaulding, am serving as a scribe at 6:37 PM on 10/7/2024 to document services personally performed by Tony Dominguez MD based on my observations and the provider's statements to me.        Tony Dominguez MD  10/07/24 1935

## 2024-10-07 NOTE — ED TRIAGE NOTES
Pt. Presents to ED with complaints of losing her voice, fever of 100.3 this AM, dizziness this AM along with persistent cough and sore throat that started yesterday. Reports her nephew has a cold. Pt. Also reports HA and bilateral eye pressure. Taking Nyquil, last dose was 1600. A & O x 4, independent, AVSS on RA, afebrile in triage. Denies medical conditions or prescribed medications.

## 2024-10-08 NOTE — DISCHARGE INSTRUCTIONS

## 2024-11-11 ENCOUNTER — APPOINTMENT (OUTPATIENT)
Dept: GENERAL RADIOLOGY | Facility: CLINIC | Age: 19
End: 2024-11-11
Attending: EMERGENCY MEDICINE

## 2024-11-11 ENCOUNTER — HOSPITAL ENCOUNTER (EMERGENCY)
Facility: CLINIC | Age: 19
Discharge: HOME | End: 2024-11-11
Attending: EMERGENCY MEDICINE | Admitting: EMERGENCY MEDICINE

## 2024-11-11 VITALS
DIASTOLIC BLOOD PRESSURE: 73 MMHG | SYSTOLIC BLOOD PRESSURE: 116 MMHG | BODY MASS INDEX: 37.75 KG/M2 | HEART RATE: 77 BPM | TEMPERATURE: 97.9 F | WEIGHT: 221.12 LBS | RESPIRATION RATE: 17 BRPM | OXYGEN SATURATION: 97 % | HEIGHT: 64 IN

## 2024-11-11 DIAGNOSIS — R07.9 LEFT-SIDED CHEST PAIN: ICD-10-CM

## 2024-11-11 DIAGNOSIS — R07.89 ATYPICAL CHEST PAIN: ICD-10-CM

## 2024-11-11 LAB
ANION GAP SERPL CALCULATED.3IONS-SCNC: 13 MMOL/L (ref 7–15)
ATRIAL RATE - MUSE: 64 BPM
BASOPHILS # BLD AUTO: 0 10E3/UL (ref 0–0.2)
BASOPHILS NFR BLD AUTO: 1 %
BUN SERPL-MCNC: 9.5 MG/DL (ref 6–20)
CALCIUM SERPL-MCNC: 9.4 MG/DL (ref 8.8–10.4)
CHLORIDE SERPL-SCNC: 103 MMOL/L (ref 98–107)
CREAT SERPL-MCNC: 0.46 MG/DL (ref 0.51–0.95)
D DIMER PPP FEU-MCNC: 0.3 UG/ML FEU (ref 0–0.5)
DIASTOLIC BLOOD PRESSURE - MUSE: NORMAL MMHG
EGFRCR SERPLBLD CKD-EPI 2021: >90 ML/MIN/1.73M2
EOSINOPHIL # BLD AUTO: 0.2 10E3/UL (ref 0–0.7)
EOSINOPHIL NFR BLD AUTO: 2 %
ERYTHROCYTE [DISTWIDTH] IN BLOOD BY AUTOMATED COUNT: 13.5 % (ref 10–15)
GLUCOSE SERPL-MCNC: 96 MG/DL (ref 70–99)
HCG SERPL QL: NEGATIVE
HCO3 SERPL-SCNC: 21 MMOL/L (ref 22–29)
HCT VFR BLD AUTO: 41.1 % (ref 35–47)
HGB BLD-MCNC: 13.6 G/DL (ref 11.7–15.7)
HOLD SPECIMEN: NORMAL
HOLD SPECIMEN: NORMAL
IMM GRANULOCYTES # BLD: 0 10E3/UL
IMM GRANULOCYTES NFR BLD: 0 %
INTERPRETATION ECG - MUSE: NORMAL
LYMPHOCYTES # BLD AUTO: 2.6 10E3/UL (ref 0.8–5.3)
LYMPHOCYTES NFR BLD AUTO: 32 %
MCH RBC QN AUTO: 27 PG (ref 26.5–33)
MCHC RBC AUTO-ENTMCNC: 33.1 G/DL (ref 31.5–36.5)
MCV RBC AUTO: 82 FL (ref 78–100)
MONOCYTES # BLD AUTO: 0.4 10E3/UL (ref 0–1.3)
MONOCYTES NFR BLD AUTO: 5 %
NEUTROPHILS # BLD AUTO: 4.8 10E3/UL (ref 1.6–8.3)
NEUTROPHILS NFR BLD AUTO: 60 %
NRBC # BLD AUTO: 0 10E3/UL
NRBC BLD AUTO-RTO: 0 /100
P AXIS - MUSE: 53 DEGREES
PLATELET # BLD AUTO: 375 10E3/UL (ref 150–450)
POTASSIUM SERPL-SCNC: 3.8 MMOL/L (ref 3.4–5.3)
PR INTERVAL - MUSE: 148 MS
QRS DURATION - MUSE: 82 MS
QT - MUSE: 392 MS
QTC - MUSE: 404 MS
R AXIS - MUSE: 55 DEGREES
RBC # BLD AUTO: 5.04 10E6/UL (ref 3.8–5.2)
SODIUM SERPL-SCNC: 137 MMOL/L (ref 135–145)
SYSTOLIC BLOOD PRESSURE - MUSE: NORMAL MMHG
T AXIS - MUSE: 15 DEGREES
TROPONIN T SERPL HS-MCNC: <6 NG/L
VENTRICULAR RATE- MUSE: 64 BPM
WBC # BLD AUTO: 8.1 10E3/UL (ref 4–11)

## 2024-11-11 PROCEDURE — 36415 COLL VENOUS BLD VENIPUNCTURE: CPT | Performed by: EMERGENCY MEDICINE

## 2024-11-11 PROCEDURE — 85004 AUTOMATED DIFF WBC COUNT: CPT | Performed by: EMERGENCY MEDICINE

## 2024-11-11 PROCEDURE — 71046 X-RAY EXAM CHEST 2 VIEWS: CPT

## 2024-11-11 PROCEDURE — 85379 FIBRIN DEGRADATION QUANT: CPT | Performed by: EMERGENCY MEDICINE

## 2024-11-11 PROCEDURE — 80048 BASIC METABOLIC PNL TOTAL CA: CPT | Performed by: EMERGENCY MEDICINE

## 2024-11-11 PROCEDURE — 84484 ASSAY OF TROPONIN QUANT: CPT | Performed by: EMERGENCY MEDICINE

## 2024-11-11 PROCEDURE — 93005 ELECTROCARDIOGRAM TRACING: CPT

## 2024-11-11 PROCEDURE — 84703 CHORIONIC GONADOTROPIN ASSAY: CPT | Performed by: EMERGENCY MEDICINE

## 2024-11-11 PROCEDURE — 99285 EMERGENCY DEPT VISIT HI MDM: CPT | Mod: 25

## 2024-11-11 ASSESSMENT — ACTIVITIES OF DAILY LIVING (ADL)
ADLS_ACUITY_SCORE: 0

## 2024-11-11 ASSESSMENT — COLUMBIA-SUICIDE SEVERITY RATING SCALE - C-SSRS
2. HAVE YOU ACTUALLY HAD ANY THOUGHTS OF KILLING YOURSELF IN THE PAST MONTH?: NO
1. IN THE PAST MONTH, HAVE YOU WISHED YOU WERE DEAD OR WISHED YOU COULD GO TO SLEEP AND NOT WAKE UP?: NO
6. HAVE YOU EVER DONE ANYTHING, STARTED TO DO ANYTHING, OR PREPARED TO DO ANYTHING TO END YOUR LIFE?: NO

## 2024-11-11 NOTE — ED TRIAGE NOTES
Pt complains of cp & sob that started on Saturday. Pt states cp is worse w/ a deep breath. No cardiac hx. Pt endorses L arm numbness. No cardiac hx.

## 2024-11-11 NOTE — LETTER
November 11, 2024      To Whom It May Concern:      Ludmila Davalos was seen in our Emergency Department today, 11/11/24.  I expect her condition to improve over the next 3 days.  She may return to work/school when improved.    Sincerely,        Fariba Mckeon RN

## 2024-11-11 NOTE — ED PROVIDER NOTES
"  Emergency Department Note      History of Present Illness     Chief Complaint   Chest Pain and Shortness of Breath (/)    HPI   Ludmila Davalos is a 19 year old female who is otherwise healthy and presents for evaluation of chest pain and shortness of breath. The patient reports that two days ago while laying in bed she had a sharp pain in her left chest and axilla, worse with breathing. States that she felt better yesterday but the pain came back again at 0200. Notes that the pain is there even without a deep breath today. Adds that when she was at work she began to have paresthesias in her left upper extremity and lips. Reports that she does have a slight cough. States that she has had superficial thrombophlebitis, but no personal or family history of DVT/PE. She denies use of hormonal birth control. Denies dyspnea on exertion, fever, and lower extremity edema.     Independent Historian   None    Review of External Notes   I reviewed [place] emergency department note from 9/16/2023 when she had foot pain and was diagnosed with superficial thrombophlebitis of the left lower extremity.     Past Medical History     Medical History and Problem List   Nonalcoholic fatty liver  Retinal detachment     Medications   Albuterol  Famotidine  Meclizine     Physical Exam     Patient Vitals for the past 24 hrs:   BP Temp Temp src Pulse Resp SpO2 Height Weight   11/11/24 1004 116/73 97.9  F (36.6  C) Temporal 77 17 97 % 1.626 m (5' 4\") 100.3 kg (221 lb 1.9 oz)     Physical Exam  General: Alert, no acute distress  HEENT:  Moist mucous membranes. Conjunctiva normal.   CV:  RRR, no m/r/g, skin warm and well perfused  Pulm:  CTAB, no wheezes/ronchi/rales.  No acute distress, breathing comfortably  GI:  Soft, nontender, nondistended.  No rebound or guarding.    MSK:  Moving all extremities.  No focal areas of edema, erythema  Skin:  WWP, no rashes, no lower extremity edema, skin color normal, no diaphoresis  Psych:  " Well-appearing, normal affect, regular speech    Diagnostics     Lab Results   Labs Ordered and Resulted from Time of ED Arrival to Time of ED Departure   BASIC METABOLIC PANEL - Abnormal       Result Value    Sodium 137      Potassium 3.8      Chloride 103      Carbon Dioxide (CO2) 21 (*)     Anion Gap 13      Urea Nitrogen 9.5      Creatinine 0.46 (*)     GFR Estimate >90      Calcium 9.4      Glucose 96     TROPONIN T, HIGH SENSITIVITY - Normal    Troponin T, High Sensitivity <6     HCG QUALITATIVE PREGNANCY - Normal    hCG Serum Qualitative Negative     D DIMER QUANTITATIVE - Normal    D-Dimer Quantitative 0.30     CBC WITH PLATELETS AND DIFFERENTIAL    WBC Count 8.1      RBC Count 5.04      Hemoglobin 13.6      Hematocrit 41.1      MCV 82      MCH 27.0      MCHC 33.1      RDW 13.5      Platelet Count 375      % Neutrophils 60      % Lymphocytes 32      % Monocytes 5      % Eosinophils 2      % Basophils 1      % Immature Granulocytes 0      NRBCs per 100 WBC 0      Absolute Neutrophils 4.8      Absolute Lymphocytes 2.6      Absolute Monocytes 0.4      Absolute Eosinophils 0.2      Absolute Basophils 0.0      Absolute Immature Granulocytes 0.0      Absolute NRBCs 0.0         Imaging   Chest XR,  PA & LAT   Final Result   IMPRESSION: Cardiopericardial silhouette is within normal limits. No   focal airspace consolidation. No pleural effusion. No discernible   pneumothorax. No acute displaced fracture.      AUSTIN HOLGUIN MD            SYSTEM ID:  [id number]        EKG   ECG taken at 1011, ECG read at 1015  Normal sinus rhythm    No change as compared to prior, dated 6/11/22.  Rate 64 bpm. RI interval 148 ms. QRS duration 82 ms. QT/QTc 392/404 ms. P-R-T axes 53 55 15.    Independent Interpretation   CXR: No pneumothorax, infiltrate, pleural effusion, visible rib fracture, or mediastinal widening.    ED Course      Medications Administered   Medications - No data to display    Procedures   Procedures     Discussion  of Management   None    ED Course   ED Course as of 11/11/24 1619   Mon Nov 11, 2024   1123 I obtained history and examined the patient as noted above.    1343 I rechecked and updated the patient.        Additional Documentation  None    Medical Decision Making / Diagnosis     CMS Diagnoses: None    MIPS       None    [facility]   Ludmila Davalos is a 19 year old female who presents to the ER for evaluation of left-sided sharp chest pain.  Please see above for the details in HPI and exam.  She is afebrile vitally stable.  She is not hypoxic or in respiratory distress.  Broad differential was considered including ACS, PE, acute aortic dissection, pneumothorax, pneumonia, pleurisy, musculoskeletal etiology amongst other things.  Fortunately, her workup here in the ER is reassuring.  EKG shows no acute ischemic appearing changes or signs of worrisome dysrhythmia or pericarditis.  High-sensitivity troponin is undetectable despite 2 days of intermittent symptoms which I feel rules out ACS.  Additionally, patient's HEART score is low.  She is low risk for PE and D-dimer is negative making this unlikely.  The rest of her basic lab studies above are unremarkable.  Chest x-ray shows no acute findings.  With reasonable clinical certainty, I do feel the patient is safe to discharge home.  Patient understands the uncertainty of the diagnosis but is reassured with the workup here.  Certainly could represent pleurisy and I did recommend use of NSAIDs as needed for pain and close follow-up with PCP in the coming days and she is comfortable with this plan.  She will return for any new or worsening symptoms that were discussed at bedside.  All questions were answered prior to discharge.    Disposition   The patient was discharged.     Diagnosis     ICD-10-CM    1. Atypical chest pain  R07.89       2. Left-sided chest pain  R07.9            Discharge Medications   Discharge Medication List as of 11/11/2024  1:54 PM        Robert  Disclosure:  I, Khloe Rai, am serving as a scribe at 11:06 AM on 11/11/2024 to document services personally performed by Jad Boston MD based on my observations and the provider's statements to me.        Jad Boston MD  11/11/24 6163

## 2024-12-13 ENCOUNTER — HOSPITAL ENCOUNTER (EMERGENCY)
Facility: CLINIC | Age: 19
Discharge: HOME | End: 2024-12-13
Attending: PHYSICIAN ASSISTANT | Admitting: PHYSICIAN ASSISTANT

## 2024-12-13 VITALS
DIASTOLIC BLOOD PRESSURE: 66 MMHG | WEIGHT: 220.02 LBS | HEIGHT: 64 IN | RESPIRATION RATE: 18 BRPM | TEMPERATURE: 97 F | HEART RATE: 92 BPM | BODY MASS INDEX: 37.56 KG/M2 | OXYGEN SATURATION: 99 % | SYSTOLIC BLOOD PRESSURE: 123 MMHG

## 2024-12-13 DIAGNOSIS — H69.91 EUSTACHIAN TUBE DYSFUNCTION, RIGHT: ICD-10-CM

## 2024-12-13 DIAGNOSIS — H65.01 NON-RECURRENT ACUTE SEROUS OTITIS MEDIA OF RIGHT EAR: ICD-10-CM

## 2024-12-13 PROCEDURE — 99283 EMERGENCY DEPT VISIT LOW MDM: CPT

## 2024-12-13 RX ORDER — FLUTICASONE PROPIONATE 50 MCG
1 SPRAY, SUSPENSION (ML) NASAL DAILY
Qty: 9.9 ML | Refills: 0 | Status: SHIPPED | OUTPATIENT
Start: 2024-12-13 | End: 2024-12-20

## 2024-12-13 NOTE — ED TRIAGE NOTES
"Pt comes in with right ear pain and fullness.  She states that it feels like she is under water on that side of her ear.  She felt a \"pop\" a week ago and since that time the ear has felt full.       Triage Assessment (Adult)       Row Name 12/13/24 6347          Triage Assessment    Airway WDL WDL        Respiratory WDL    Respiratory WDL WDL        Cardiac WDL    Cardiac WDL WDL                     "

## 2024-12-13 NOTE — ED PROVIDER NOTES
"  Emergency Department Note      History of Present Illness     Chief Complaint   Ear Fullness      HPI   Ludmila Davalos is a 19 year old female who presents to the ED for evaluation of ear fullness. Patient notes onset of right sided ear fullness that began one week ago. She notes associated nasal congestion. No significant ear pain. No fevers. No discharge from the ear. She has not taken anything for her symptoms.    Independent Historian   None    Review of External Notes   None    Past Medical History     Medical History and Problem List   No past medical history on file.    Medications   fluticasone (FLONASE) 50 MCG/ACT nasal spray  sodium chloride (OCEAN) 0.65 % nasal spray  acetaminophen-codeine (TYLENOL #3) 300-30 MG tablet  albuterol (PROVENTIL) (2.5 MG/3ML) 0.083% neb solution  famotidine (PEPCID) 10 MG tablet  meclizine (ANTIVERT) 25 MG tablet        Surgical History   No past surgical history on file.    Physical Exam     Patient Vitals for the past 24 hrs:   BP Temp Temp src Pulse Resp SpO2 Height Weight   12/13/24 1715 123/66 97  F (36.1  C) Temporal 92 18 99 % 1.626 m (5' 4\") 99.8 kg (220 lb 0.3 oz)     Physical Exam  Constitutional: Pleasant. Cooperative.   Eyes: Pupils equally round and reactive  HENT: Head is normal in appearance. Right TM is slightly bulging with air fluid levels, but translucent, no erythema. Right canal is normal. No mastoid erythema or TTP. Left TM and canal are normal. No trismus. No muffled voice. Tolerating their secretions.  Cardiovascular: Regular rate and rhythm.  Respiratory: Normal respiratory effort, lungs are clear bilaterally.  Neck: Normal ROM.   Skin: Normal, without rash.  Neurologic: Cranial nerves grossly intact. Alert.  Nursing notes and vital signs reviewed.      Diagnostics     Lab Results   Labs Ordered and Resulted from Time of ED Arrival to Time of ED Departure - No data to display    Imaging   No orders to display     Independent Interpretation "   None    ED Course      Medications Administered   Medications - No data to display    Procedures   Procedures     Discussion of Management   None    ED Course        Additional Documentation  None    Medical Decision Making / Diagnosis     CMS Diagnoses: None    MIPS       None    MDM   Ludmila Davalos is a 19 year old female who presents to the ED for evaluation of ear fullness. See HPI as above for additional details. Vitals and physical exam as above. DDx was broad and included OM, OE, malignant OE, mastoiditis, referred pain from the mouth, amongst others. Patient has evidence for effusion to the right ear, suspect associated ETD. No suggestion for infection at this time. Will start patient on medications as below. She can use Tylenol/ibuprofen for any pain. Discussed conservative cares otherwise. Do feel patient is safe for discharge to home. Follow up as needed. Discussed reasons to return. All questions answered. Patient discharged to home in stable condition.    Disposition   The patient was discharged.     Diagnosis     ICD-10-CM    1. Non-recurrent acute serous otitis media of right ear  H65.01       2. Eustachian tube dysfunction, right  H69.91            Discharge Medications   New Prescriptions    FLUTICASONE (FLONASE) 50 MCG/ACT NASAL SPRAY    Spray 1 spray into both nostrils daily for 7 days.    SODIUM CHLORIDE (OCEAN) 0.65 % NASAL SPRAY    Spray 1 spray in nostril 2 times daily for 7 days.       This record was created at least in part using electronic voice recognition software, so please excuse any typographical errors.         Gamaliel Gamez PA-C  12/13/24 1738

## 2024-12-13 NOTE — DISCHARGE INSTRUCTIONS
Try medications as prescribed to see if this helps with symptoms. Avoid blowing your nose with too much force to avoid pushing more fluid up the eustachian tubes. If you try over-the-counter oral decongestants, only do so for 3 days maximum, as these can cause rebound congestion. If you develop severe ear pain or fever, be reevaluated.

## 2024-12-23 NOTE — LETTER
October 7, 2024      To Whom It May Concern:      Ludmila Davalos was seen in our Emergency Department today, 10/07/24.  I expect her condition to improve over the next 1-2 days.  She may return to work 10/09/24. Please excuse her absences until then.    Sincerely,        Harmony MANRIQUEZ RN        
labs reviewed

## 2025-03-17 ENCOUNTER — TELEPHONE (OUTPATIENT)
Dept: OTOLARYNGOLOGY | Facility: CLINIC | Age: 20
End: 2025-03-17

## 2025-03-17 NOTE — TELEPHONE ENCOUNTER
Left message with callback number to determine what patient is coming in to be seen for and if an audiogram is needed.    Aracely Carrasquillo RN Care Coordinator, [facility] 03/17/25 8:54 AM

## 2025-03-18 NOTE — TELEPHONE ENCOUNTER
2nd attempt.  Left message with callback number.    Aracely Carrasquillo RN Care Coordinator, [facility] 03/18/25 8:31 AM

## 2025-03-19 NOTE — TELEPHONE ENCOUNTER
3rd unsuccessful attempt.  No further attempts.  Closing encounter.    Aracely Carrasquillo RN Care Coordinator, [facility] 03/19/25 9:58 AM

## 2025-04-28 ENCOUNTER — HOSPITAL ENCOUNTER (EMERGENCY)
Facility: CLINIC | Age: 20
Discharge: HOME | End: 2025-04-28
Attending: EMERGENCY MEDICINE | Admitting: EMERGENCY MEDICINE
Payer: COMMERCIAL

## 2025-04-28 ENCOUNTER — APPOINTMENT (OUTPATIENT)
Dept: ULTRASOUND IMAGING | Facility: CLINIC | Age: 20
End: 2025-04-28
Attending: EMERGENCY MEDICINE
Payer: COMMERCIAL

## 2025-04-28 VITALS
BODY MASS INDEX: 35.76 KG/M2 | HEIGHT: 64 IN | TEMPERATURE: 98.4 F | SYSTOLIC BLOOD PRESSURE: 112 MMHG | WEIGHT: 209.44 LBS | HEART RATE: 73 BPM | DIASTOLIC BLOOD PRESSURE: 92 MMHG | RESPIRATION RATE: 17 BRPM | OXYGEN SATURATION: 99 %

## 2025-04-28 DIAGNOSIS — R10.30 LOWER ABDOMINAL PAIN: ICD-10-CM

## 2025-04-28 DIAGNOSIS — N92.1 MENOMETRORRHAGIA: ICD-10-CM

## 2025-04-28 LAB
ALBUMIN SERPL BCG-MCNC: 4.4 G/DL (ref 3.5–5.2)
ALBUMIN UR-MCNC: NEGATIVE MG/DL
ALP SERPL-CCNC: 53 U/L (ref 40–150)
ALT SERPL W P-5'-P-CCNC: 35 U/L (ref 0–50)
ANION GAP SERPL CALCULATED.3IONS-SCNC: 7 MMOL/L (ref 7–15)
APPEARANCE UR: CLEAR
AST SERPL W P-5'-P-CCNC: 16 U/L (ref 0–45)
BACTERIA #/AREA URNS HPF: ABNORMAL /HPF
BASOPHILS # BLD AUTO: 0 10E3/UL (ref 0–0.2)
BASOPHILS NFR BLD AUTO: 0 %
BILIRUB SERPL-MCNC: 0.3 MG/DL
BILIRUB UR QL STRIP: NEGATIVE
BUN SERPL-MCNC: 8.1 MG/DL (ref 6–20)
CALCIUM SERPL-MCNC: 9.5 MG/DL (ref 8.8–10.4)
CHLORIDE SERPL-SCNC: 103 MMOL/L (ref 98–107)
COLOR UR AUTO: ABNORMAL
CREAT SERPL-MCNC: 0.58 MG/DL (ref 0.51–0.95)
EGFRCR SERPLBLD CKD-EPI 2021: >90 ML/MIN/1.73M2
EOSINOPHIL # BLD AUTO: 0.1 10E3/UL (ref 0–0.7)
EOSINOPHIL NFR BLD AUTO: 1 %
ERYTHROCYTE [DISTWIDTH] IN BLOOD BY AUTOMATED COUNT: 13.9 % (ref 10–15)
GLUCOSE SERPL-MCNC: 90 MG/DL (ref 70–99)
GLUCOSE UR STRIP-MCNC: NEGATIVE MG/DL
HCG UR QL: NEGATIVE
HCO3 SERPL-SCNC: 24 MMOL/L (ref 22–29)
HCT VFR BLD AUTO: 37.9 % (ref 35–47)
HGB BLD-MCNC: 12.7 G/DL (ref 11.7–15.7)
HGB UR QL STRIP: ABNORMAL
HOLD SPECIMEN: NORMAL
HOLD SPECIMEN: NORMAL
IMM GRANULOCYTES # BLD: 0 10E3/UL
IMM GRANULOCYTES NFR BLD: 0 %
KETONES UR STRIP-MCNC: NEGATIVE MG/DL
LEUKOCYTE ESTERASE UR QL STRIP: NEGATIVE
LYMPHOCYTES # BLD AUTO: 2.5 10E3/UL (ref 0.8–5.3)
LYMPHOCYTES NFR BLD AUTO: 20 %
MCH RBC QN AUTO: 27.4 PG (ref 26.5–33)
MCHC RBC AUTO-ENTMCNC: 33.5 G/DL (ref 31.5–36.5)
MCV RBC AUTO: 82 FL (ref 78–100)
MONOCYTES # BLD AUTO: 0.5 10E3/UL (ref 0–1.3)
MONOCYTES NFR BLD AUTO: 4 %
NEUTROPHILS # BLD AUTO: 9.1 10E3/UL (ref 1.6–8.3)
NEUTROPHILS NFR BLD AUTO: 74 %
NITRATE UR QL: NEGATIVE
NRBC # BLD AUTO: 0 10E3/UL
NRBC BLD AUTO-RTO: 0 /100
PH UR STRIP: 5.5 [PH] (ref 5–7)
PLATELET # BLD AUTO: 335 10E3/UL (ref 150–450)
POTASSIUM SERPL-SCNC: 3.9 MMOL/L (ref 3.4–5.3)
PROT SERPL-MCNC: 7.5 G/DL (ref 6.4–8.3)
RBC # BLD AUTO: 4.64 10E6/UL (ref 3.8–5.2)
RBC URINE: 1 /HPF
SODIUM SERPL-SCNC: 134 MMOL/L (ref 135–145)
SP GR UR STRIP: 1 (ref 1–1.03)
SQUAMOUS EPITHELIAL: 2 /HPF
T4 FREE SERPL-MCNC: 0.96 NG/DL (ref 0.9–1.7)
TSH SERPL DL<=0.005 MIU/L-ACNC: 4.4 UIU/ML (ref 0.3–4.2)
UROBILINOGEN UR STRIP-MCNC: NORMAL MG/DL
WBC # BLD AUTO: 12.3 10E3/UL (ref 4–11)
WBC URINE: 1 /HPF

## 2025-04-28 PROCEDURE — 81025 URINE PREGNANCY TEST: CPT | Performed by: EMERGENCY MEDICINE

## 2025-04-28 PROCEDURE — 84155 ASSAY OF PROTEIN SERUM: CPT | Performed by: EMERGENCY MEDICINE

## 2025-04-28 PROCEDURE — 76856 US EXAM PELVIC COMPLETE: CPT

## 2025-04-28 PROCEDURE — 96372 THER/PROPH/DIAG INJ SC/IM: CPT | Performed by: EMERGENCY MEDICINE

## 2025-04-28 PROCEDURE — 99285 EMERGENCY DEPT VISIT HI MDM: CPT | Mod: 25

## 2025-04-28 PROCEDURE — 250N000011 HC RX IP 250 OP 636: Mod: JZ | Performed by: EMERGENCY MEDICINE

## 2025-04-28 PROCEDURE — 85025 COMPLETE CBC W/AUTO DIFF WBC: CPT | Performed by: EMERGENCY MEDICINE

## 2025-04-28 PROCEDURE — 84443 ASSAY THYROID STIM HORMONE: CPT | Performed by: EMERGENCY MEDICINE

## 2025-04-28 PROCEDURE — 36415 COLL VENOUS BLD VENIPUNCTURE: CPT | Performed by: EMERGENCY MEDICINE

## 2025-04-28 PROCEDURE — 81003 URINALYSIS AUTO W/O SCOPE: CPT | Performed by: EMERGENCY MEDICINE

## 2025-04-28 PROCEDURE — 85004 AUTOMATED DIFF WBC COUNT: CPT | Performed by: EMERGENCY MEDICINE

## 2025-04-28 PROCEDURE — 81001 URINALYSIS AUTO W/SCOPE: CPT | Performed by: EMERGENCY MEDICINE

## 2025-04-28 PROCEDURE — 84439 ASSAY OF FREE THYROXINE: CPT | Performed by: EMERGENCY MEDICINE

## 2025-04-28 RX ORDER — KETOROLAC TROMETHAMINE 30 MG/ML
30 INJECTION, SOLUTION INTRAMUSCULAR; INTRAVENOUS ONCE
Status: COMPLETED | OUTPATIENT
Start: 2025-04-28 | End: 2025-04-28

## 2025-04-28 RX ADMIN — KETOROLAC TROMETHAMINE 30 MG: 30 INJECTION, SOLUTION INTRAMUSCULAR at 17:34

## 2025-04-28 ASSESSMENT — ACTIVITIES OF DAILY LIVING (ADL)
ADLS_ACUITY_SCORE: 41

## 2025-04-28 ASSESSMENT — COLUMBIA-SUICIDE SEVERITY RATING SCALE - C-SSRS
6. HAVE YOU EVER DONE ANYTHING, STARTED TO DO ANYTHING, OR PREPARED TO DO ANYTHING TO END YOUR LIFE?: NO
1. IN THE PAST MONTH, HAVE YOU WISHED YOU WERE DEAD OR WISHED YOU COULD GO TO SLEEP AND NOT WAKE UP?: NO
2. HAVE YOU ACTUALLY HAD ANY THOUGHTS OF KILLING YOURSELF IN THE PAST MONTH?: NO

## 2025-04-28 NOTE — ED TRIAGE NOTES
Pt complains of lower mid abd pain that started 8 days ago. Pt stated that she has an irregular period and it started 8 days when the pain started. Pt complains of nausea, chills, vomiting. No urinary symptoms.

## 2025-04-28 NOTE — ED PROVIDER NOTES
"  Emergency Department Note      History of Present Illness     Chief Complaint   Abdominal Pain    HPI   Ludmila Davalos is a 19 year old female with history of PCOS who presents to the ED for evaluation of abdominal pain. Ludmila began her menstrual cycle on 04/20, her period has been irregular for the last year since she stopped taking birth control. Since the 20th, she's had bad cramps which interfere with her sleep, nausea, lightheadedness, chills, and clots from her vagina. Ludmila has tried Tylenol, ibuprofen, and Midol, none of which have relieved her symptoms. She denies a fever, dysuria, or vaginal discharge. Ludmila is sexually active. No history of fibroids or pregnancy.    Independent Historian   None    Review of External Notes   I reviewed the ED note from 12/13/2024 for otitis media.    Past Medical History     Medical History and Problem List   Acanthosis nigricans  Autoimmune thyroiditis   Choroid rupture   Dysfunctional uterine bleeding  Hypercholesteremia   Hypothyroid   Migraine   MDD   Nonalcoholic fatty liver disease   Obesity   PCOS   Prediabetes   Steatosis of liver   Superficial thrombophlebitis   Total retinal detachment     Medications   Synthroid   Glucophage   Pepcid     Surgical History   No past surgical history on file.    Physical Exam     Patient Vitals for the past 24 hrs:   BP Temp Temp src Pulse Resp SpO2 Height Weight   04/28/25 1551 (!) 112/92 98.4  F (36.9  C) Oral 73 17 99 % 1.626 m (5' 4\") 95 kg (209 lb 7 oz)     Physical Exam  General: Alert, seated comfortably on a chair in fast-track.  HENT: mucous membranes moist  CV: regular rate, regular rhythm  Resp: normal effort, clear throughout, no crackles or wheezing  GI: abdomen soft, mild lower abdominal tenderness.  No rebound or guarding.  No pain over McBurney's point.  MSK: no bony tenderness  Skin: appropriately warm and dry  Extremities: no edema, calves non-tender  Neuro: alert, clear speech, oriented  Psych: normal " mood and affect      Diagnostics     Lab Results   Labs Ordered and Resulted from Time of ED Arrival to Time of ED Departure   COMPREHENSIVE METABOLIC PANEL - Abnormal       Result Value    Sodium 134 (*)     Potassium 3.9      Carbon Dioxide (CO2) 24      Anion Gap 7      Urea Nitrogen 8.1      Creatinine 0.58      GFR Estimate >90      Calcium 9.5      Chloride 103      Glucose 90      Alkaline Phosphatase 53      AST 16      ALT 35      Protein Total 7.5      Albumin 4.4      Bilirubin Total 0.3     ROUTINE UA WITH MICROSCOPIC REFLEX TO CULTURE - Abnormal    Color Urine Straw      Appearance Urine Clear      Glucose Urine Negative      Bilirubin Urine Negative      Ketones Urine Negative      Specific Gravity Urine 1.004      Blood Urine Large (*)     pH Urine 5.5      Protein Albumin Urine Negative      Urobilinogen Urine Normal      Nitrite Urine Negative      Leukocyte Esterase Urine Negative      Bacteria Urine Few (*)     RBC Urine 1      WBC Urine 1      Squamous Epithelials Urine 2 (*)    CBC WITH PLATELETS AND DIFFERENTIAL - Abnormal    WBC Count 12.3 (*)     RBC Count 4.64      Hemoglobin 12.7      Hematocrit 37.9      MCV 82      MCH 27.4      MCHC 33.5      RDW 13.9      Platelet Count 335      % Neutrophils 74      % Lymphocytes 20      % Monocytes 4      % Eosinophils 1      % Basophils 0      % Immature Granulocytes 0      NRBCs per 100 WBC 0      Absolute Neutrophils 9.1 (*)     Absolute Lymphocytes 2.5      Absolute Monocytes 0.5      Absolute Eosinophils 0.1      Absolute Basophils 0.0      Absolute Immature Granulocytes 0.0      Absolute NRBCs 0.0     TSH WITH FREE T4 REFLEX - Abnormal    TSH 4.40 (*)    HCG QUALITATIVE URINE - Normal    hCG Urine Qualitative Negative     T4 FREE - Normal    Free T4 0.96       Imaging   US Pelvic Complete w Transvaginal   Final Result   IMPRESSION:      1.  Multiple prominent vessels along the endometrium, which likely relate to the patient's menstrual status.  No focal endometrial lesions are identified.      2.  Otherwise, normal pelvic ultrasound.        Independent Interpretation   None    ED Course      Medications Administered   Medications   ketorolac (TORADOL) injection 30 mg (30 mg Intramuscular $Given 4/28/25 2350)     Procedures   Procedures     Discussion of Management   None    ED Course   ED Course as of 04/28/25 2046 Mon Apr 28, 2025 1706 I obtained history and examined the patient as noted above.    2041 I rechecked the patient.  She is feeling improved.     Additional Documentation  None    Medical Decision Making / Diagnosis     CMS Diagnoses: None    MIPS       None    MDM   Ludmila Davalos is a 19 year old female otherwise healthy, who presents today with abdominal pain in the setting of menometrorrhagia.  On exam, the patient has normal vitals.  She has a benign abdominal exam.  No concerns at this point for appendicitis.  Ectopic pregnancy was ruled out with negative pregnancy test.  Labs demonstrate a normal hemoglobin, the patient does not have history of vaginal hemorrhage.  No signs to suggest a coagulopathy today, with normal platelet count.  TSH returned slightly elevated, but free T4 is normal.  UA negative for signs of infection.  Pelvic ultrasound is negative for fibroids, other masses, adenomyosis.  No concerning findings to suggest ovarian torsion.  Based on exam history, I did not recommend additional imaging tonight.  As noted above, no concerns for appendicitis, diverticulitis, bowel obstruction, or other dangerous cause of the patient's symptoms.  She was noted to have a very slightly elevated white blood cell count, which is nonspecific in the setting of her other symptoms.  Because the patient's symptoms tonight is unclear, though she does have history of PCOS.  I suspect pain is related to recurrent menstrual cycle.  I have recommended outpatient follow-up with gynecology.  Patient is feeling better following Toradol,  therefore I recommended continued ibuprofen as needed at home.  Return to the ED for increasing pain, fever, heavy vaginal bleeding, or for other concerns.    Disposition   The patient was discharged.     Diagnosis     ICD-10-CM    1. Menometrorrhagia  N92.1 Ob/Gyn  Referral      2. Lower abdominal pain  R10.30 Ob/Gyn  Referral         IPoornima, am serving as a scribe at 5:04 PM on 4/28/2025 to document services personally performed by Catherine Contreras MD based on my observations and the provider's statements to me.        Catherine Contreras MD  04/29/25 0103

## 2025-04-29 NOTE — DISCHARGE INSTRUCTIONS
Your ultrasound today did not show any dangerous cause of your vaginal bleeding or lower abdominal pain.  I have placed a referral for evaluation by gynecology.  Return to the ED right away if you develop severe bleeding, which means that you are bleeding through more than a pad an hour for about 3 hours, you develop increasing abdominal pain, passing out, increased weakness, fever, or for any other concerns  Discharge Instructions  Abdominal Pain    Abdominal pain (belly pain) can be caused by many things. Your evaluation today does not show the exact cause for your pain. Your provider today has decided that it is unlikely your pain is due to a life threatening problem, or a problem requiring surgery or hospital admission. Sometimes those problems cannot be found right away, so it is very important that you follow up as directed.  Sometimes only the changes which occur over time allow the cause of your pain to be found.    Generally, every Emergency Department visit should have a follow-up clinic visit with either a primary or a specialty clinic/provider. Please follow-up as instructed by your emergency provider today. With abdominal pain, we often recommend very close follow-up, such as the following day.    ADULTS:  Return to the Emergency Department right away if:    You get an oral temperature above 102oF or as directed by your provider.  You have blood in your stools. This may be bright red or appear as black, tarry stools.    You keep vomiting (throwing up) or cannot drink liquids.  You see blood when you vomit.   You cannot have a bowel movement or you cannot pass gas.  Your stomach gets bloated or bigger.  Your skin or the whites of your eyes look yellow.  You faint.  You have bloody, frequent or painful urination (peeing).  You have new symptoms or anything that worries you.    CHILDREN:  Return to the Emergency Department right away if your child has any of the above-listed symptoms or the  following:    Pushes your hand away or screams/cries when his/her belly is touched.  You notice your child is very fussy or weak.  Your child is very tired and is too tired to eat or drink.  Your child is dehydrated.  Signs of dehydration can be:  Significant change in the amount of wet diapers/urine.  Your infant or child starts to have dry mouth and lips, or no saliva (spit) or tears.    PREGNANT WOMEN:  Return to the Emergency Department right away if you have any of the above-listed symptoms or the following:    You have bleeding, leaking fluid or passing tissue from the vagina.  You have worse pain or cramping, or pain in your shoulder or back.  You have vomiting that will not stop.  You have a temperature of 100oF or more.  Your baby is not moving as much as usual.  You faint.  You get a bad headache with or without eye problems and abdominal pain.  You have a seizure.  You have unusual discharge from your vagina and abdominal pain.    Abdominal pain is pretty common during pregnancy.  Your pain may or may not be related to your pregnancy. You should follow-up closely with your OB provider so they can evaluate you and your baby.  Until you follow-up with your regular provider, do the following:     Avoid sex and do not put anything in your vagina.  Drink clear fluids.  Only take medications approved by your provider.    MORE INFORMATION:    Appendicitis:  A possible cause of abdominal pain in any person who still has their appendix is acute appendicitis. Appendicitis is often hard to diagnose.  Testing does not always rule out early appendicitis or other causes of abdominal pain. Close follow-up with your provider and re-evaluations may be needed to figure out the reason for your abdominal pain.    Follow-up:  It is very important that you make an appointment with your clinic and go to the appointment.  If you do not follow-up with your primary provider, it may result in missing an important development which  "could result in permanent injury or disability and/or lasting pain.  If there is any problem keeping your appointment, call your provider or return to the Emergency Department.    Medications:  Take your medications as directed by your provider today.  Before using over-the-counter medications, ask your provider and make sure to take the medications as directed.  If you have any questions about medications, ask your provider.    Diet:  Resume your normal diet as much as possible, but do not eat fried, fatty or spicy foods while you have pain.  Do not drink alcohol or have caffeine.  Do not smoke tobacco.    Probiotics: If you have been given an antibiotic, you may want to also take a probiotic pill or eat yogurt with live cultures. Probiotics have \"good bacteria\" to help your intestines stay healthy. Studies have shown that probiotics help prevent diarrhea (loose stools) and other intestine problems (including C. diff infection) when you take antibiotics. You can buy these without a prescription in the pharmacy section of the store.     If you were given a prescription for medicine here today, be sure to read all of the information (including the package insert) that comes with your prescription.  This will include important information about the medicine, its side effects, and any warnings that you need to know about.  The pharmacist who fills the prescription can provide more information and answer questions you may have about the medicine.  If you have questions or concerns that the pharmacist cannot address, please call or return to the Emergency Department.       Remember that you can always come back to the Emergency Department if you are not able to see your regular provider in the amount of time listed above, if you get any new symptoms, or if there is anything that worries you.    "

## 2025-05-01 ENCOUNTER — TELEPHONE (OUTPATIENT)
Dept: OBGYN | Facility: CLINIC | Age: 20
End: 2025-05-01
Payer: COMMERCIAL

## 2025-05-05 ENCOUNTER — TELEPHONE (OUTPATIENT)
Dept: OBGYN | Facility: CLINIC | Age: 20
End: 2025-05-05
Payer: COMMERCIAL

## 2025-05-06 ENCOUNTER — OFFICE VISIT (OUTPATIENT)
Dept: OBGYN | Facility: CLINIC | Age: 20
End: 2025-05-06
Attending: EMERGENCY MEDICINE
Payer: COMMERCIAL

## 2025-05-06 VITALS
HEART RATE: 80 BPM | RESPIRATION RATE: 16 BRPM | DIASTOLIC BLOOD PRESSURE: 71 MMHG | WEIGHT: 205.6 LBS | BODY MASS INDEX: 35.1 KG/M2 | HEIGHT: 64 IN | TEMPERATURE: 98.5 F | SYSTOLIC BLOOD PRESSURE: 109 MMHG

## 2025-05-06 DIAGNOSIS — N92.1 MENOMETRORRHAGIA: ICD-10-CM

## 2025-05-06 DIAGNOSIS — N93.9 ABNORMAL UTERINE BLEEDING (AUB): Primary | ICD-10-CM

## 2025-05-06 DIAGNOSIS — Z11.3 SCREEN FOR STD (SEXUALLY TRANSMITTED DISEASE): ICD-10-CM

## 2025-05-06 DIAGNOSIS — E28.2 PCOS (POLYCYSTIC OVARIAN SYNDROME): ICD-10-CM

## 2025-05-06 DIAGNOSIS — R10.30 LOWER ABDOMINAL PAIN: ICD-10-CM

## 2025-05-06 LAB
HCG UR QL: NEGATIVE
INTERNAL QC OK POCT: NORMAL
POCT KIT EXPIRATION DATE: NORMAL
POCT KIT LOT NUMBER: NORMAL

## 2025-05-06 PROCEDURE — 88305 TISSUE EXAM BY PATHOLOGIST: CPT | Performed by: STUDENT IN AN ORGANIZED HEALTH CARE EDUCATION/TRAINING PROGRAM

## 2025-05-06 PROCEDURE — 87491 CHLMYD TRACH DNA AMP PROBE: CPT | Performed by: OBSTETRICS & GYNECOLOGY

## 2025-05-06 PROCEDURE — 3078F DIAST BP <80 MM HG: CPT | Performed by: OBSTETRICS & GYNECOLOGY

## 2025-05-06 PROCEDURE — 87591 N.GONORRHOEAE DNA AMP PROB: CPT | Performed by: OBSTETRICS & GYNECOLOGY

## 2025-05-06 PROCEDURE — 58100 BIOPSY OF UTERUS LINING: CPT | Performed by: OBSTETRICS & GYNECOLOGY

## 2025-05-06 PROCEDURE — 81025 URINE PREGNANCY TEST: CPT | Performed by: OBSTETRICS & GYNECOLOGY

## 2025-05-06 PROCEDURE — 99204 OFFICE O/P NEW MOD 45 MIN: CPT | Mod: 25 | Performed by: OBSTETRICS & GYNECOLOGY

## 2025-05-06 PROCEDURE — 99459 PELVIC EXAMINATION: CPT | Performed by: OBSTETRICS & GYNECOLOGY

## 2025-05-06 PROCEDURE — 3074F SYST BP LT 130 MM HG: CPT | Performed by: OBSTETRICS & GYNECOLOGY

## 2025-05-06 RX ORDER — IBUPROFEN 200 MG
600 TABLET ORAL ONCE
Status: COMPLETED | OUTPATIENT
Start: 2025-05-06 | End: 2025-05-06

## 2025-05-06 RX ORDER — NORETHINDRONE 5 MG/1
5 TABLET ORAL 2 TIMES DAILY
Qty: 20 TABLET | Refills: 0 | Status: SHIPPED | OUTPATIENT
Start: 2025-05-06 | End: 2025-05-16

## 2025-05-06 RX ORDER — ACETAMINOPHEN 500 MG
500-1000 TABLET ORAL EVERY 6 HOURS PRN
COMMUNITY

## 2025-05-06 RX ADMIN — Medication 200 MG: at 14:11

## 2025-05-06 NOTE — PATIENT INSTRUCTIONS
Start Aygestin (norethindrone) 5mg, take 1 tab by mouth twice daily for 10 days.  This should help stop your period.  When you finish the Aygestin, you will likely have a period and this is OK as it will help clean out the remaining thickened lining.    Once your period starts, you will want to start your birth control patch within 7 days (you can choose which day of the week you want to start).    Return to clinic in 1-2 months for a Pap smear (screening test for cervical cancer).    Contact the clinic with questions/concerns.

## 2025-05-06 NOTE — PROGRESS NOTES
Clinic Administered Medication Documentation    Patient was given 600mg ibuprofen. Prior to medication administration, verified patient's identity using patient's name and date of birth.    Arabella Carpenter, CMA

## 2025-05-06 NOTE — PROGRESS NOTES
"NEW PATIENT OUTPATIENT VISIT     Chief complaint/Reason for visit: Abnormal uterine bleeding    HPI: 20yo G0 with history of PCOS referred by the ER for follow up of menometrorrhagia.  She was seen in the ED on 4/28/25 for bleeding with passage of clots as well as cramping.    Diagnosed with PCOS ~ 2 years ago at outside facility-- reports having labwork done to confirm.  Was placed on OCPs to regulate periods.  Went on/off a couple times, has been off for 8 months now.  Has not had a period that entire time.  Has been bleeding for the past 2 weeks.  Has been intermittently light/heavy, passing up to plum sized clots.  Denies lightheadedness.    Sexually active, using condoms presently.    Per Epic, saw PCP a few days ago and was given Rx for patch.    Due for first Pap.    Medical History and Problem List   Acanthosis nigricans  Autoimmune thyroiditis   Choroid rupture   Dysfunctional uterine bleeding  Hypercholesteremia   Hypothyroid   Migraine   MDD   Nonalcoholic fatty liver disease   Obesity   PCOS   Prediabetes   Steatosis of liver   Superficial thrombophlebitis   Total retinal detachment     ------------------------------------------------------------------------  Review of Systems:     With the exception of any items noted in HPI, the remainder of the GI and  ROS is negative.    ------------------------------------------------------------------------    The medical, surgical, social and family histories were reviewed and updated.     ------------------------------------------------------------------------  Physical Exam:    Constitutional:   - /71 (BP Location: Left arm, Patient Position: Sitting, Cuff Size: Adult Regular)   Pulse 80   Temp 98.5  F (36.9  C)   Resp 16   Ht 1.626 m (5' 4\")   Wt 93.3 kg (205 lb 9.6 oz)   LMP 04/20/2025 (Exact Date)   BMI 35.29 kg/m    - General Appearance: pleasant, well-appearing, NAD    Genitourinary/Female Genitalia:  - EGBUS: normal-appearing external " genitalia and perineum  - Vagina: normal vaginal mucosa, moderate amount dark blood in vault.  - Cervix: without lesions, moderate active bleeding noted so Pap not done.  - Bladder/Urethra: nontender, without masses  - Uterus: midline, mobile, nontender, not enlarged  - Adnexa: no adnexal masses or tenderness appreciated    Abdomen/GI:  - Perineum/Anus/Rectum: normal appearing, no hemorrhoids    ------------------------------------------------------------------------  Labs/Studies Reviewed:     EXAM: US PELVIC TRANSABDOMINAL AND TRANSVAGINAL  LOCATION: [facility]  DATE: 4/28/2025     INDICATION: Vaginal bleeding. Pelvic pain.  COMPARISON: CT abdomen pelvis 7/16/2021.  TECHNIQUE: Transabdominal scans were performed. Endovaginal ultrasound was performed to better visualize the adnexa.     FINDINGS:     UTERUS: 6.4 x 5.1 x 3.6 cm. Anteverted position. Homogenous myometrium. No discrete fibroids.     ENDOMETRIUM: 13 mm. Smooth and mildly heterogeneous endometrium. Multiple prominent vessels are present along the endometrium. No focal endometrial lesions are identified.     RIGHT OVARY: 3.6 x 2.2 x 2.2 cm. Normal appearance.     LEFT OVARY: 2.5 x 2.0 x 1.6 cm. Normal appearance.     No significant free fluid.                                                                      IMPRESSION:     1.  Multiple prominent vessels along the endometrium, which likely relate to the patient's menstrual status. No focal endometrial lesions are identified.     2.  Otherwise, normal pelvic ultrasound.    Component      Latest Ref Rng 4/28/2025  4:25 PM 4/28/2025  4:35 PM   WBC      4.0 - 11.0 10e3/uL 12.3 (H)     RBC Count      3.80 - 5.20 10e6/uL 4.64     Hemoglobin      11.7 - 15.7 g/dL 12.7     Hematocrit      35.0 - 47.0 % 37.9     MCV      78 - 100 fL 82     MCH      26.5 - 33.0 pg 27.4     MCHC      31.5 - 36.5 g/dL 33.5     RDW      10.0 - 15.0 % 13.9     Platelet Count      150 - 450 10e3/uL 335     %  Neutrophils      % 74     % Lymphocytes      % 20     % Monocytes      % 4     % Eosinophils      % 1     % Basophils      % 0     % Immature Granulocytes      % 0     NRBC/W      <1 /100 0     Absolute Neutrophil      1.6 - 8.3 10e3/uL 9.1 (H)     Absolute Lymphocytes      0.8 - 5.3 10e3/uL 2.5     Absolute Monocytes      0.0 - 1.3 10e3/uL 0.5     Absolute Eosinophils      0.0 - 0.7 10e3/uL 0.1     Absolute Basophils      0.0 - 0.2 10e3/uL 0.0     Absolute Immature Granulocytes      <=0.4 10e3/uL 0.0     Absolute NRBCs      10e3/uL 0.0     Sodium      135 - 145 mmol/L 134 (L)     Potassium      3.4 - 5.3 mmol/L 3.9     Carbon Dioxide (CO2)      22 - 29 mmol/L 24     Anion Gap      7 - 15 mmol/L 7     Urea Nitrogen      6.0 - 20.0 mg/dL 8.1     Creatinine      0.51 - 0.95 mg/dL 0.58     GFR Estimate      >60 mL/min/1.73m2 >90     Calcium      8.8 - 10.4 mg/dL 9.5     Chloride      98 - 107 mmol/L 103     Glucose      70 - 99 mg/dL 90     Alkaline Phosphatase      40 - 150 U/L 53     AST      0 - 45 U/L 16     ALT      0 - 50 U/L 35     Protein Total      6.4 - 8.3 g/dL 7.5     Albumin      3.5 - 5.2 g/dL 4.4     Bilirubin Total      <=1.2 mg/dL 0.3     Color Urine      Colorless, Straw, Light Yellow, Yellow   Straw    Appearance Urine      Clear   Clear    Glucose Urine      Negative mg/dL  Negative    Bilirubin Urine      Negative   Negative    Ketones Urine      Negative mg/dL  Negative    Specific Gravity Urine      1.003 - 1.035   1.004    Blood Urine      Negative   Large !    pH Urine      5.0 - 7.0   5.5    Protein Albumin Urine      Negative mg/dL  Negative    Urobilinogen mg/dL      Normal mg/dL  Normal    Nitrite Urine      Negative   Negative    Leukocyte Esterase Urine      Negative   Negative    Bacteria Urine      None Seen /HPF  Few !    RBC Urine      <=2 /HPF  1    WBC Urine      <=5 /HPF  1    Squamous Epithelial /HPF Urine      <=1 /HPF  2 (H)    HCG Qual Urine      Negative   Negative    TSH       0.30 - 4.20 uIU/mL 4.40 (H)     T4 Free      0.90 - 1.70 ng/dL 0.96        Legend:  (H) High  (L) Low  ! Abnormal    ------------------------------------------------------------------------  Procedure: ENDOMETRIAL BIOPSY    Rationale for procedure was discussed with patient.  Risks and benefits of procedure were also discussed and informed consent was obtained.    Procedure:     The patient was placed in the dorsal lithotomy position.    A speculum was inserted into the vagina.  The cervical os was visualized and prepped with betadine.      An Allis was placed on the anterior lip of the cervix for stabilization.  A sterile flexible Pipelle biopsy catheter was inserted into the uterine cavity.  The uterus sounded to 7cm.  Three passes were obtained using rotating and oscillating action with suction.  A moderate amount of tissue was obtained.    The Allis was removed.  Moderate bleeding was noted from the cervical os.    Patient tolerated the procedure well.  No complications.    ------------------------------------------------------------------------  Assessment/Plan:  19 year old with PCOS now with 2+ weeks of bleeding after 8 months of amenorrhea.  -- Discussed pathophysiology of PCOS, anovulation leading to amenorrhea and eventually heavy period.  Also discussed risk of endometrial hyperplasia/cancer and advised EmBx.  -- EmBx performed without issue.  The patient was advised to call for any fever or for prolonged or severe pain or bleeding. She was advised to use OTC NSAIDs as needed for mild to moderate pain. She was advised to avoid vaginal intercourse for 48 hours or until the bleeding has completely stopped. She will be notified of pathology when available.  -- Pap not done today due to heavy bleeding but advised returning in next few months to get this done.  Screening G/C was sent.  -- Rx sent in for Aygestin 5mg, take 1 tab bid x 10 days to stop acute episode of bleeding.  Discussed may have another period  after stopping Aygestin, should then start patch within 7 days.  Should use backup contraception until has taken patch for a week.  I am hopeful this will decrease the amount of breakthrough bleeding that patient would likely have if she went directly on the patch.  -- Reviewed bleeding precautions, when to seek emergent care.    Susannah Arellano MD

## 2025-05-06 NOTE — LETTER
2025    Ludmila Davalos   2005        To Whom it May Concern;    Please excuse Ludmila IVANA Davalos from work for a healthcare visit on May 6, 2025.    Sincerely,        Susannah Arellano MD

## 2025-05-07 LAB
C TRACH DNA SPEC QL PROBE+SIG AMP: NEGATIVE
N GONORRHOEA DNA SPEC QL NAA+PROBE: NEGATIVE
SPECIMEN TYPE: NORMAL

## 2025-05-08 ENCOUNTER — RESULTS FOLLOW-UP (OUTPATIENT)
Dept: OBGYN | Facility: CLINIC | Age: 20
End: 2025-05-08

## 2025-05-08 LAB
PATH REPORT.COMMENTS IMP SPEC: NORMAL
PATH REPORT.COMMENTS IMP SPEC: NORMAL
PATH REPORT.FINAL DX SPEC: NORMAL
PATH REPORT.GROSS SPEC: NORMAL
PATH REPORT.MICROSCOPIC SPEC OTHER STN: NORMAL
PATH REPORT.RELEVANT HX SPEC: NORMAL
PHOTO IMAGE: NORMAL

## 2025-05-29 PROCEDURE — 99285 EMERGENCY DEPT VISIT HI MDM: CPT | Mod: 25

## 2025-05-30 ENCOUNTER — APPOINTMENT (OUTPATIENT)
Dept: CT IMAGING | Facility: CLINIC | Age: 20
End: 2025-05-30
Attending: EMERGENCY MEDICINE
Payer: COMMERCIAL

## 2025-05-30 ENCOUNTER — HOSPITAL ENCOUNTER (EMERGENCY)
Facility: CLINIC | Age: 20
Discharge: HOME | End: 2025-05-30
Attending: EMERGENCY MEDICINE | Admitting: EMERGENCY MEDICINE
Payer: COMMERCIAL

## 2025-05-30 VITALS
OXYGEN SATURATION: 98 % | TEMPERATURE: 97.8 F | BODY MASS INDEX: 35.94 KG/M2 | RESPIRATION RATE: 16 BRPM | SYSTOLIC BLOOD PRESSURE: 122 MMHG | WEIGHT: 210.54 LBS | HEIGHT: 64 IN | DIASTOLIC BLOOD PRESSURE: 64 MMHG | HEART RATE: 77 BPM

## 2025-05-30 DIAGNOSIS — K29.00 ACUTE GASTRITIS WITHOUT HEMORRHAGE, UNSPECIFIED GASTRITIS TYPE: ICD-10-CM

## 2025-05-30 LAB
ALBUMIN SERPL BCG-MCNC: 4.7 G/DL (ref 3.5–5.2)
ALBUMIN UR-MCNC: NEGATIVE MG/DL
ALP SERPL-CCNC: 59 U/L (ref 40–150)
ALT SERPL W P-5'-P-CCNC: 21 U/L (ref 0–50)
ANION GAP SERPL CALCULATED.3IONS-SCNC: 12 MMOL/L (ref 7–15)
APPEARANCE UR: CLEAR
AST SERPL W P-5'-P-CCNC: 16 U/L (ref 0–45)
BACTERIA #/AREA URNS HPF: ABNORMAL /HPF
BASOPHILS # BLD AUTO: 0.1 10E3/UL (ref 0–0.2)
BASOPHILS NFR BLD AUTO: 0 %
BILIRUB SERPL-MCNC: 0.2 MG/DL
BILIRUB UR QL STRIP: NEGATIVE
BUN SERPL-MCNC: 7.3 MG/DL (ref 6–20)
CALCIUM SERPL-MCNC: 9.2 MG/DL (ref 8.8–10.4)
CHLORIDE SERPL-SCNC: 102 MMOL/L (ref 98–107)
COLOR UR AUTO: ABNORMAL
CREAT SERPL-MCNC: 0.51 MG/DL (ref 0.51–0.95)
EGFRCR SERPLBLD CKD-EPI 2021: >90 ML/MIN/1.73M2
EOSINOPHIL # BLD AUTO: 0.1 10E3/UL (ref 0–0.7)
EOSINOPHIL NFR BLD AUTO: 1 %
ERYTHROCYTE [DISTWIDTH] IN BLOOD BY AUTOMATED COUNT: 13.2 % (ref 10–15)
GLUCOSE SERPL-MCNC: 112 MG/DL (ref 70–99)
GLUCOSE UR STRIP-MCNC: 30 MG/DL
HCG SERPL QL: NEGATIVE
HCO3 SERPL-SCNC: 24 MMOL/L (ref 22–29)
HCT VFR BLD AUTO: 38.9 % (ref 35–47)
HGB BLD-MCNC: 12.5 G/DL (ref 11.7–15.7)
HGB UR QL STRIP: NEGATIVE
IMM GRANULOCYTES # BLD: 0 10E3/UL
IMM GRANULOCYTES NFR BLD: 0 %
KETONES UR STRIP-MCNC: NEGATIVE MG/DL
LEUKOCYTE ESTERASE UR QL STRIP: ABNORMAL
LIPASE SERPL-CCNC: 22 U/L (ref 13–60)
LYMPHOCYTES # BLD AUTO: 1.9 10E3/UL (ref 0.8–5.3)
LYMPHOCYTES NFR BLD AUTO: 16 %
MCH RBC QN AUTO: 26.9 PG (ref 26.5–33)
MCHC RBC AUTO-ENTMCNC: 32.1 G/DL (ref 31.5–36.5)
MCV RBC AUTO: 84 FL (ref 78–100)
MONOCYTES # BLD AUTO: 0.7 10E3/UL (ref 0–1.3)
MONOCYTES NFR BLD AUTO: 6 %
MUCOUS THREADS #/AREA URNS LPF: PRESENT /LPF
NEUTROPHILS # BLD AUTO: 9.1 10E3/UL (ref 1.6–8.3)
NEUTROPHILS NFR BLD AUTO: 77 %
NITRATE UR QL: NEGATIVE
NRBC # BLD AUTO: 0 10E3/UL
NRBC BLD AUTO-RTO: 0 /100
PH UR STRIP: 7.5 [PH] (ref 5–7)
PLATELET # BLD AUTO: 392 10E3/UL (ref 150–450)
POTASSIUM SERPL-SCNC: 4 MMOL/L (ref 3.4–5.3)
PROT SERPL-MCNC: 7.6 G/DL (ref 6.4–8.3)
RBC # BLD AUTO: 4.64 10E6/UL (ref 3.8–5.2)
RBC URINE: 1 /HPF
SODIUM SERPL-SCNC: 138 MMOL/L (ref 135–145)
SP GR UR STRIP: 1.01 (ref 1–1.03)
SQUAMOUS EPITHELIAL: 4 /HPF
UROBILINOGEN UR STRIP-MCNC: NORMAL MG/DL
WBC # BLD AUTO: 11.8 10E3/UL (ref 4–11)
WBC URINE: 1 /HPF

## 2025-05-30 PROCEDURE — 250N000011 HC RX IP 250 OP 636: Performed by: EMERGENCY MEDICINE

## 2025-05-30 PROCEDURE — 96376 TX/PRO/DX INJ SAME DRUG ADON: CPT

## 2025-05-30 PROCEDURE — 250N000009 HC RX 250: Performed by: EMERGENCY MEDICINE

## 2025-05-30 PROCEDURE — 250N000013 HC RX MED GY IP 250 OP 250 PS 637: Performed by: EMERGENCY MEDICINE

## 2025-05-30 PROCEDURE — 85025 COMPLETE CBC W/AUTO DIFF WBC: CPT | Performed by: EMERGENCY MEDICINE

## 2025-05-30 PROCEDURE — 96375 TX/PRO/DX INJ NEW DRUG ADDON: CPT

## 2025-05-30 PROCEDURE — 84703 CHORIONIC GONADOTROPIN ASSAY: CPT | Performed by: EMERGENCY MEDICINE

## 2025-05-30 PROCEDURE — 83690 ASSAY OF LIPASE: CPT | Performed by: EMERGENCY MEDICINE

## 2025-05-30 PROCEDURE — 99285 EMERGENCY DEPT VISIT HI MDM: CPT

## 2025-05-30 PROCEDURE — 81001 URINALYSIS AUTO W/SCOPE: CPT | Performed by: EMERGENCY MEDICINE

## 2025-05-30 PROCEDURE — 80053 COMPREHEN METABOLIC PANEL: CPT | Performed by: EMERGENCY MEDICINE

## 2025-05-30 PROCEDURE — 36415 COLL VENOUS BLD VENIPUNCTURE: CPT | Performed by: EMERGENCY MEDICINE

## 2025-05-30 PROCEDURE — 96374 THER/PROPH/DIAG INJ IV PUSH: CPT | Mod: 59

## 2025-05-30 PROCEDURE — 74177 CT ABD & PELVIS W/CONTRAST: CPT

## 2025-05-30 RX ORDER — MAGNESIUM HYDROXIDE/ALUMINUM HYDROXICE/SIMETHICONE 120; 1200; 1200 MG/30ML; MG/30ML; MG/30ML
15 SUSPENSION ORAL ONCE
Status: COMPLETED | OUTPATIENT
Start: 2025-05-30 | End: 2025-05-30

## 2025-05-30 RX ORDER — KETOROLAC TROMETHAMINE 15 MG/ML
15 INJECTION, SOLUTION INTRAMUSCULAR; INTRAVENOUS ONCE
Status: COMPLETED | OUTPATIENT
Start: 2025-05-30 | End: 2025-05-30

## 2025-05-30 RX ORDER — IOPAMIDOL 755 MG/ML
500 INJECTION, SOLUTION INTRAVASCULAR ONCE
Status: COMPLETED | OUTPATIENT
Start: 2025-05-30 | End: 2025-05-30

## 2025-05-30 RX ORDER — LIDOCAINE HYDROCHLORIDE 20 MG/ML
10 SOLUTION OROPHARYNGEAL ONCE
Status: COMPLETED | OUTPATIENT
Start: 2025-05-30 | End: 2025-05-30

## 2025-05-30 RX ORDER — MORPHINE SULFATE 4 MG/ML
4 INJECTION, SOLUTION INTRAMUSCULAR; INTRAVENOUS
Refills: 0 | Status: DISCONTINUED | OUTPATIENT
Start: 2025-05-30 | End: 2025-05-30 | Stop reason: HOSPADM

## 2025-05-30 RX ORDER — ONDANSETRON 2 MG/ML
4 INJECTION INTRAMUSCULAR; INTRAVENOUS EVERY 30 MIN PRN
Status: DISCONTINUED | OUTPATIENT
Start: 2025-05-30 | End: 2025-05-30 | Stop reason: HOSPADM

## 2025-05-30 RX ORDER — OMEPRAZOLE 20 MG/1
20 CAPSULE, DELAYED RELEASE ORAL DAILY
Qty: 14 CAPSULE | Refills: 0 | Status: SHIPPED | OUTPATIENT
Start: 2025-05-30 | End: 2025-06-13

## 2025-05-30 RX ORDER — ONDANSETRON 4 MG/1
4 TABLET, ORALLY DISINTEGRATING ORAL EVERY 8 HOURS PRN
Qty: 10 TABLET | Refills: 0 | Status: SHIPPED | OUTPATIENT
Start: 2025-05-30 | End: 2025-06-02

## 2025-05-30 RX ADMIN — SODIUM CHLORIDE 80 ML: 9 INJECTION, SOLUTION INTRAVENOUS at 01:47

## 2025-05-30 RX ADMIN — ONDANSETRON 4 MG: 2 INJECTION, SOLUTION INTRAMUSCULAR; INTRAVENOUS at 03:05

## 2025-05-30 RX ADMIN — IOPAMIDOL 100 ML: 755 INJECTION, SOLUTION INTRAVENOUS at 01:47

## 2025-05-30 RX ADMIN — ONDANSETRON 4 MG: 2 INJECTION, SOLUTION INTRAMUSCULAR; INTRAVENOUS at 00:51

## 2025-05-30 RX ADMIN — ALUMINUM HYDROXIDE, MAGNESIUM HYDROXIDE, AND SIMETHICONE 15 ML: 200; 200; 20 SUSPENSION ORAL at 03:29

## 2025-05-30 RX ADMIN — LIDOCAINE HYDROCHLORIDE 10 ML: 20 SOLUTION ORAL at 03:29

## 2025-05-30 RX ADMIN — MORPHINE SULFATE 4 MG: 4 INJECTION INTRAVENOUS at 00:51

## 2025-05-30 RX ADMIN — KETOROLAC TROMETHAMINE 15 MG: 15 INJECTION, SOLUTION INTRAMUSCULAR; INTRAVENOUS at 01:37

## 2025-05-30 ASSESSMENT — COLUMBIA-SUICIDE SEVERITY RATING SCALE - C-SSRS
2. HAVE YOU ACTUALLY HAD ANY THOUGHTS OF KILLING YOURSELF IN THE PAST MONTH?: NO
6. HAVE YOU EVER DONE ANYTHING, STARTED TO DO ANYTHING, OR PREPARED TO DO ANYTHING TO END YOUR LIFE?: NO
1. IN THE PAST MONTH, HAVE YOU WISHED YOU WERE DEAD OR WISHED YOU COULD GO TO SLEEP AND NOT WAKE UP?: NO

## 2025-05-30 ASSESSMENT — ACTIVITIES OF DAILY LIVING (ADL)
ADLS_ACUITY_SCORE: 41

## 2025-05-30 NOTE — Clinical Note
Ludmila Davalos was seen and treated in our emergency department on 5/29/2025.  She may return to work on 06/02/2025.       If you have any questions or concerns, please don't hesitate to call.      Amauri Merrill MD

## 2025-05-30 NOTE — ED PROVIDER NOTES
"  Emergency Department Note      History of Present Illness     Chief Complaint   Abdominal Pain      HPI   Ludmila Davalos is a 19 year old female who presents to the emergency department today for evaluation of abdominal pain. The patient reports she was at work yesterday when she developed upper quadrant abdominal pain at 1100. This pain was constant and progressed in severity throughout the day. The pain was not exacerbated, and could not be palliated even with Tylenol. Ludmila notes she works at a  center, and that she has also been experiencing nausea, chills, and back pain. She denies a history of similar episodes, dysuria, fevers, vaginal bleeding, abnormal vaginal discharge, chance of pregnancy, shortness of breath, sore throat, or abdominal surgeries. Ludmila notes this does not feel like acid reflux.     Independent Historian   None    Review of External Notes   None    Past Medical History     Medical History and Problem List   Retinal detachment of left eye (H)    Medications   albuterol  famotidine  meclizine   Norgestim-eth estrad triphasic     Physical Exam     Patient Vitals for the past 24 hrs:   BP Temp Temp src Pulse Resp SpO2 Height Weight   05/29/25 2315 132/85 97.8  F (36.6  C) Temporal 77 16 99 % 1.626 m (5' 4\") 95.5 kg (210 lb 8.6 oz)     Physical Exam  General: Patient is awake, alert and interactive when I enter the room. Appears uncomfortable.   Head: The scalp, face, and head appear normal  Eyes: Conjunctivae and sclerae are normal  Neck: Normal range of motion.   CV: Regular rate.   Resp:  No respiratory distress.   GI: epigastric tenderness but abdomen is soft, no rigidity. No evidence of pulsatile mass. No fluid waves or evidence of ascites. No distension. No hernias or bruising are noted in detailed exam. No CVA tenderness.    MS: Normal tone.   Skin: Normal capillary refill noted  Neuro: Speech is normal and fluent. Face is symmetric. Moving all extremities.   Psych:  " Normal affect.  Appropriate interactions.    Diagnostics     Lab Results   Labs Ordered and Resulted from Time of ED Arrival to Time of ED Departure   ROUTINE UA WITH MICROSCOPIC REFLEX TO CULTURE - Abnormal       Result Value    Color Urine Light Yellow      Appearance Urine Clear      Glucose Urine 30 (*)     Bilirubin Urine Negative      Ketones Urine Negative      Specific Gravity Urine 1.011      Blood Urine Negative      pH Urine 7.5 (*)     Protein Albumin Urine Negative      Urobilinogen Urine Normal      Nitrite Urine Negative      Leukocyte Esterase Urine Trace (*)     Bacteria Urine Few (*)     Mucus Urine Present (*)     RBC Urine 1      WBC Urine 1      Squamous Epithelials Urine 4 (*)    CBC WITH PLATELETS AND DIFFERENTIAL - Abnormal    WBC Count 11.8 (*)     RBC Count 4.64      Hemoglobin 12.5      Hematocrit 38.9      MCV 84      MCH 26.9      MCHC 32.1      RDW 13.2      Platelet Count 392      % Neutrophils 77      % Lymphocytes 16      % Monocytes 6      % Eosinophils 1      % Basophils 0      % Immature Granulocytes 0      NRBCs per 100 WBC 0      Absolute Neutrophils 9.1 (*)     Absolute Lymphocytes 1.9      Absolute Monocytes 0.7      Absolute Eosinophils 0.1      Absolute Basophils 0.1      Absolute Immature Granulocytes 0.0      Absolute NRBCs 0.0     COMPREHENSIVE METABOLIC PANEL - Abnormal    Sodium 138      Potassium 4.0      Carbon Dioxide (CO2) 24      Anion Gap 12      Urea Nitrogen 7.3      Creatinine 0.51      GFR Estimate >90      Calcium 9.2      Chloride 102      Glucose 112 (*)     Alkaline Phosphatase 59      AST 16      ALT 21      Protein Total 7.6      Albumin 4.7      Bilirubin Total 0.2     LIPASE - Normal    Lipase 22     HCG QUALITATIVE PREGNANCY - Normal    hCG Serum Qualitative Negative         Imaging   CT Abdomen Pelvis w Contrast   Final Result   IMPRESSION:    1.  No acute inflammatory changes in the abdomen and pelvis. Formed stool material within normal-caliber  colon without obstruction, free gas or free fluid. Normal appendix.      2.  Diffusely fatty infiltrated enlarged liver without discrete lesion, unchanged. Mild splenomegaly, slightly more apparent on current study. No adenopathy or ascites.      3.  No urinary tract calculi or obstruction. Follicles in both ovaries. Anteverted normal uterus.        Independent Interpretation   None    ED Course      Medications Administered   Medications   morphine (PF) injection 4 mg (4 mg Intravenous $Given 5/30/25 0051)   ondansetron (ZOFRAN) injection 4 mg (4 mg Intravenous $Given 5/30/25 0305)   ketorolac (TORADOL) injection 15 mg (15 mg Intravenous $Given 5/30/25 0137)   sodium chloride 0.9 % bag for CT scan flush (80 mLs Intravenous $Given 5/30/25 0147)   iopamidol (ISOVUE-370) solution 500 mL (100 mLs Intravenous $Given 5/30/25 0147)   alum & mag hydroxide-simethicone (MAALOX) suspension 15 mL (15 mLs Oral $Given 5/30/25 0329)   lidocaine (viscous) (XYLOCAINE) 2 % solution 10 mL (10 mLs Mouth/Throat $Given 5/30/25 0329)       Procedures   Procedures     Discussion of Management   None    ED Course   ED Course as of 05/30/25 0433   Fri May 30, 2025   0033 I obtained history and examined the patient as noted above.     0251 I rechecked with patient and explained findings   0428 I explained findings to the patient and we discussed plan for discharge. The patient is comfortable with this plan.       Additional Documentation  None    Medical Decision Making / Diagnosis     CMS Diagnoses: None    MIPS   None    MDM   Ludmlia Davalos is a 19 year old female who presents emergency department with epigastric pain that has been gradually worsening since 11 AM yesterday.  She has had associated nausea but no vomiting.  Upon initial evaluation here she is hemodynamically stable with her vital signs.  She is afebrile and oxygenating well on room air.  On physical exam she has some diffuse abdominal tenderness.  Therefore CT scan  was obtained.  This shows evidence of fatty liver disease and mild splenomegaly.  These results were discussed with the patient.  Natural history of hepatic steatosis was discussed as well as lifestyle changes and possible treatment options.  However there is no evidence of an acute cause for her pain nor significant sinister pathology.  Blood work was obtained which shows no signs of hepatitis or pancreatitis.  UA is negative for signs of infection.  The remainder of blood work was reassuring other than a mild leukocytosis.  Patient was treated with a forementioned interventions.  Also received GI cocktail with significant improvement of her symptoms.  Most likely her presentation is consistent with gastritis.  Will start her on a course of Prilosec and she can use Zofran as needed for nausea.  I recommend that she follow-up with GI for endoscopy.  Further rule out peptic ulcer disease and H. pylori seems warranted.    Disposition   The patient was discharged.     Diagnosis     ICD-10-CM    1. Acute gastritis without hemorrhage, unspecified gastritis type  K29.00          Discharge Medications   New Prescriptions    OMEPRAZOLE (PRILOSEC) 20 MG DR CAPSULE    Take 1 capsule (20 mg) by mouth daily for 14 days.    ONDANSETRON (ZOFRAN ODT) 4 MG ODT TAB    Take 1 tablet (4 mg) by mouth every 8 hours as needed for nausea.     Scribe Disclosure:  Mychal WILKINS, am serving as a scribe at 12:45 AM on 5/30/2025 to document services personally performed by Amauri Merrill based on my observations and the provider's statements to me.        Amauri Merrill MD  05/30/25 9977

## 2025-05-30 NOTE — ED TRIAGE NOTES
Patient arrives to ED endorsing severe abdominal pain that began around 11a and has become worse. She has been unable to sleep d/t pain. Took tylenol around 8p with no relief, denies V/D but is nauseated. On arrival AVSS, A&Ox4, ambulatory.   Triage Assessment (Adult)       Row Name 05/29/25 5345          Triage Assessment    Airway WDL WDL        Respiratory WDL    Respiratory WDL WDL        Skin Circulation/Temperature WDL    Skin Circulation/Temperature WDL WDL        Cardiac WDL    Cardiac WDL WDL        Peripheral/Neurovascular WDL    Peripheral Neurovascular WDL WDL        Cognitive/Neuro/Behavioral WDL    Cognitive/Neuro/Behavioral WDL WDL

## 2025-05-31 ENCOUNTER — HOSPITAL ENCOUNTER (EMERGENCY)
Facility: HOSPITAL | Age: 20
Discharge: HOME | End: 2025-05-31
Attending: STUDENT IN AN ORGANIZED HEALTH CARE EDUCATION/TRAINING PROGRAM | Admitting: STUDENT IN AN ORGANIZED HEALTH CARE EDUCATION/TRAINING PROGRAM
Payer: COMMERCIAL

## 2025-05-31 ENCOUNTER — APPOINTMENT (OUTPATIENT)
Dept: ULTRASOUND IMAGING | Facility: HOSPITAL | Age: 20
End: 2025-05-31
Attending: STUDENT IN AN ORGANIZED HEALTH CARE EDUCATION/TRAINING PROGRAM
Payer: COMMERCIAL

## 2025-05-31 VITALS
DIASTOLIC BLOOD PRESSURE: 74 MMHG | RESPIRATION RATE: 16 BRPM | HEIGHT: 64 IN | SYSTOLIC BLOOD PRESSURE: 119 MMHG | WEIGHT: 210 LBS | HEART RATE: 69 BPM | BODY MASS INDEX: 35.85 KG/M2 | OXYGEN SATURATION: 97 % | TEMPERATURE: 98.5 F

## 2025-05-31 DIAGNOSIS — R10.13 ABDOMINAL PAIN, EPIGASTRIC: ICD-10-CM

## 2025-05-31 LAB
ALBUMIN SERPL BCG-MCNC: 4.5 G/DL (ref 3.5–5.2)
ALP SERPL-CCNC: 60 U/L (ref 40–150)
ALT SERPL W P-5'-P-CCNC: 19 U/L (ref 0–50)
ANION GAP SERPL CALCULATED.3IONS-SCNC: 8 MMOL/L (ref 7–15)
AST SERPL W P-5'-P-CCNC: 14 U/L (ref 0–45)
BASOPHILS # BLD AUTO: 0 10E3/UL (ref 0–0.2)
BASOPHILS NFR BLD AUTO: 0 %
BILIRUB SERPL-MCNC: 0.3 MG/DL
BUN SERPL-MCNC: 9.2 MG/DL (ref 6–20)
CALCIUM SERPL-MCNC: 9.1 MG/DL (ref 8.8–10.4)
CHLORIDE SERPL-SCNC: 103 MMOL/L (ref 98–107)
CREAT SERPL-MCNC: 0.51 MG/DL (ref 0.51–0.95)
EGFRCR SERPLBLD CKD-EPI 2021: >90 ML/MIN/1.73M2
EOSINOPHIL # BLD AUTO: 0 10E3/UL (ref 0–0.7)
EOSINOPHIL NFR BLD AUTO: 0 %
ERYTHROCYTE [DISTWIDTH] IN BLOOD BY AUTOMATED COUNT: 13.3 % (ref 10–15)
GLUCOSE SERPL-MCNC: 129 MG/DL (ref 70–99)
HCG SERPL QL: NEGATIVE
HCO3 SERPL-SCNC: 27 MMOL/L (ref 22–29)
HCT VFR BLD AUTO: 39.3 % (ref 35–47)
HGB BLD-MCNC: 12.7 G/DL (ref 11.7–15.7)
IMM GRANULOCYTES # BLD: 0 10E3/UL
IMM GRANULOCYTES NFR BLD: 0 %
LIPASE SERPL-CCNC: 17 U/L (ref 13–60)
LYMPHOCYTES # BLD AUTO: 1.6 10E3/UL (ref 0.8–5.3)
LYMPHOCYTES NFR BLD AUTO: 12 %
MCH RBC QN AUTO: 26.8 PG (ref 26.5–33)
MCHC RBC AUTO-ENTMCNC: 32.3 G/DL (ref 31.5–36.5)
MCV RBC AUTO: 83 FL (ref 78–100)
MONOCYTES # BLD AUTO: 0.4 10E3/UL (ref 0–1.3)
MONOCYTES NFR BLD AUTO: 3 %
NEUTROPHILS # BLD AUTO: 10.8 10E3/UL (ref 1.6–8.3)
NEUTROPHILS NFR BLD AUTO: 84 %
NRBC # BLD AUTO: 0 10E3/UL
NRBC BLD AUTO-RTO: 0 /100
PLATELET # BLD AUTO: 396 10E3/UL (ref 150–450)
POTASSIUM SERPL-SCNC: 4.1 MMOL/L (ref 3.4–5.3)
PROT SERPL-MCNC: 7.7 G/DL (ref 6.4–8.3)
RBC # BLD AUTO: 4.73 10E6/UL (ref 3.8–5.2)
SODIUM SERPL-SCNC: 138 MMOL/L (ref 135–145)
WBC # BLD AUTO: 12.9 10E3/UL (ref 4–11)

## 2025-05-31 PROCEDURE — 36415 COLL VENOUS BLD VENIPUNCTURE: CPT | Performed by: EMERGENCY MEDICINE

## 2025-05-31 PROCEDURE — 96374 THER/PROPH/DIAG INJ IV PUSH: CPT

## 2025-05-31 PROCEDURE — 76705 ECHO EXAM OF ABDOMEN: CPT

## 2025-05-31 PROCEDURE — 250N000011 HC RX IP 250 OP 636: Mod: JZ | Performed by: STUDENT IN AN ORGANIZED HEALTH CARE EDUCATION/TRAINING PROGRAM

## 2025-05-31 PROCEDURE — 82947 ASSAY GLUCOSE BLOOD QUANT: CPT | Performed by: EMERGENCY MEDICINE

## 2025-05-31 PROCEDURE — 85004 AUTOMATED DIFF WBC COUNT: CPT | Performed by: EMERGENCY MEDICINE

## 2025-05-31 PROCEDURE — 250N000013 HC RX MED GY IP 250 OP 250 PS 637: Performed by: STUDENT IN AN ORGANIZED HEALTH CARE EDUCATION/TRAINING PROGRAM

## 2025-05-31 PROCEDURE — 84703 CHORIONIC GONADOTROPIN ASSAY: CPT | Performed by: EMERGENCY MEDICINE

## 2025-05-31 PROCEDURE — 83690 ASSAY OF LIPASE: CPT | Performed by: EMERGENCY MEDICINE

## 2025-05-31 RX ORDER — SUCRALFATE ORAL 1 G/10ML
1 SUSPENSION ORAL 4 TIMES DAILY
Qty: 560 ML | Refills: 0 | Status: SHIPPED | OUTPATIENT
Start: 2025-05-31 | End: 2025-06-14

## 2025-05-31 RX ORDER — KETOROLAC TROMETHAMINE 15 MG/ML
15 INJECTION, SOLUTION INTRAMUSCULAR; INTRAVENOUS ONCE
Status: COMPLETED | OUTPATIENT
Start: 2025-05-31 | End: 2025-05-31

## 2025-05-31 RX ORDER — MAGNESIUM HYDROXIDE/ALUMINUM HYDROXICE/SIMETHICONE 120; 1200; 1200 MG/30ML; MG/30ML; MG/30ML
15 SUSPENSION ORAL ONCE
Status: COMPLETED | OUTPATIENT
Start: 2025-05-31 | End: 2025-05-31

## 2025-05-31 RX ADMIN — KETOROLAC TROMETHAMINE 15 MG: 15 INJECTION, SOLUTION INTRAMUSCULAR; INTRAVENOUS at 01:54

## 2025-05-31 RX ADMIN — ALUMINUM HYDROXIDE, MAGNESIUM HYDROXIDE, AND SIMETHICONE 15 ML: 200; 200; 20 SUSPENSION ORAL at 01:54

## 2025-05-31 ASSESSMENT — ACTIVITIES OF DAILY LIVING (ADL)
ADLS_ACUITY_SCORE: 41
ADLS_ACUITY_SCORE: 41

## 2025-05-31 NOTE — Clinical Note
Ludmila Davalos was seen and treated in our emergency department on 5/30/2025.  She may return to work on 06/01/2025.       If you have any questions or concerns, please don't hesitate to call.      Zenon Varela MD

## 2025-05-31 NOTE — DISCHARGE INSTRUCTIONS
Continue to use over-the-counter remedies for upset stomach including Pepto-Bismol, Tums, Maalox, in addition to Tylenol.  Avoid ibuprofen, as this can worsen stomach irritation.  Also use the prescribed medications from your primary care visit yesterday, in addition to the newly prescribed sucralfate as directed.    Please return to the emergency department if your symptoms worsen, if you are too nauseous to drink fluids.

## 2025-05-31 NOTE — ED TRIAGE NOTES
Pt reports onset of upper mid abdominal pain with nausea and emesis yesterday at 1100 am. Pt also reports lightheadedness and chills.

## 2025-05-31 NOTE — ED PROVIDER NOTES
"EMERGENCY DEPARTMENT ENCOUNTER      NAME: Ludmila Davalos  AGE: 19 year old female  YOB: 2005  MRN: [id number]  EVALUATION DATE & TIME: 5/31/2025  1:30 AM    PCP: [facility]    ED PROVIDER: Zenon Varela MD      Chief Complaint   Patient presents with    Abdominal Pain    Nausea & Vomiting    Chills    Dizziness         FINAL IMPRESSION:  1. Abdominal pain, epigastric          ED COURSE & MEDICAL DECISION MAKING:    Pertinent Labs & Imaging studies reviewed. (See chart for details)  19 year old female presents to the Emergency Department for evaluation of abd pain    ED Course as of 05/31/25 0318   Sat May 31, 2025   0143 Patient is a 19-year-old female who presents to the emergency department with epigastric abdominal pain that does not radiate.  She has some associated nausea.  No prior abdominal surgeries.  She was seen yesterday in a different emergency department with the same symptoms, had lab work, CT abdomen and pelvis, which were overall reassuring other than diffuse fatty infiltration of the liver, and thought likely to be gastritis, and discharged with prescriptions for Zofran and a PPI.  When asked what medications she is taking to help her symptoms, she did not include these medications, but with further prompting states \"they do not work\".  On exam she has epigastric tenderness, and mild right upper quadrant and left upper quadrant tenderness.  No evidence of peritonitis.  Given her recent reassuring workup, I have a low suspicion for emergency pathology, however as her symptoms have persisted and worsened, will recheck labs to see if there is an upward trend in transaminases or lipase, and will also obtain right upper quadrant ultrasound, as occasionally CT scans can miss cholecystitis otherwise will treat with Toradol and GI cocktail   0307 Pregnancy test negative.  Mild leukocytosis at 12.9.  No anemia.  No electrolyte abnormalities or kidney " injury.  No transaminitis or pancreatitis.   0313 Ultrasound shows hepatic steatosis, no evidence of biliary pathology,   And no stones to suggest symptomatic cholelithiasis.  Will add in sucralfate to the patient's current regimen at home in the event that this is peptic ulcer disease.  Will encourage follow-up with PCP, and if symptoms persist, then they would likely put in a referral for gastroenterology, but this problem is too acute for referral at this time.       Medical Decision Making  I reviewed the EMR: Outpatient Record: previous day PCP visit and labs/imaging  I independently interpreted the US and note no stones, no gallbladder inflammation. See radiology report for final interpretation.  Discharge. I prescribed additional prescription strength medication(s) as charted. See documentation for any additional details.    MIPS (CTPE, Dental pain, Samuel, Sinusitis, Asthma/COPD, Head Trauma): Not Applicable    SEPSIS: None            At the conclusion of the encounter I discussed the results of all of the tests and the disposition. The questions were answered. The patient or family acknowledged understanding and was agreeable with the care plan.     0 minutes of critical care time     MEDICATIONS GIVEN IN THE EMERGENCY:  Medications   alum & mag hydroxide-simethicone (MAALOX) suspension 15 mL (15 mLs Oral $Given 5/31/25 0154)   ketorolac (TORADOL) injection 15 mg (15 mg Intravenous $Given 5/31/25 0154)       NEW PRESCRIPTIONS STARTED AT TODAY'S ER VISIT  New Prescriptions    SUCRALFATE (CARAFATE) 1 GM/10ML SUSPENSION    Take 10 mLs (1 g) by mouth 4 times daily for 14 days.          =================================================================    HPI    Patient information was obtained from: Patient    Use of : N/A        Ludmila IVANA Carlo Susannah is a 19 year old female with a pertinent history of non-alcoholic fatty liver, obesity, and PCOS who presents to this ED via private vehicle for  "evaluation of abdominal pain, nausea, vomiting, lightheadedness, and chills.    Patient presents with constant, \"sharp\" epigastric abdominal pain, vomiting, lightheadedness, chills, and nausea. She reports taking Tylenol, Prilosec, and Zofran with minimal relief in symptoms. No prior abdominal surgeries. She also notes some constipation which began today.     Patient denies diarrhea, urinary symptoms, melena, hematochezia, or any other complaints at this time.      Chart Review:  05/30/2025: Patient was seen at [facility] ED for evaluation of abdominal pain with associated nausea, chills, and back pain. CT abdomen pelvis was obtained and showed no acute inflammatory changes in the abdomen and pelvis. Patient treated with morphine 4 mg IV, Zofran 4 mg IV, Toradol 15 mg IV, fluids, Maalox, and viscous lidocaine. Patient was prescribed a course of Prilosec with PRN Zofran. Patient discharged.       PAST MEDICAL HISTORY:  No past medical history on file.    PAST SURGICAL HISTORY:  No past surgical history on file.        CURRENT MEDICATIONS:    acetaminophen (TYLENOL) 500 MG tablet  acetaminophen-codeine (TYLENOL #3) 300-30 MG tablet  albuterol (PROVENTIL) (2.5 MG/3ML) 0.083% neb solution  famotidine (PEPCID) 10 MG tablet  meclizine (ANTIVERT) 25 MG tablet  omeprazole (PRILOSEC) 20 MG DR capsule  ondansetron (ZOFRAN ODT) 4 MG ODT tab  sucralfate (CARAFATE) 1 GM/10ML suspension        ALLERGIES:  No Known Allergies    FAMILY HISTORY:  No family history on file.    SOCIAL HISTORY:   Social History     Socioeconomic History    Marital status: Single   Tobacco Use    Smoking status: Never    Smokeless tobacco: Never   Vaping Use    Vaping status: Never Used   Substance and Sexual Activity    Sexual activity: Yes       VITALS:  /63   Pulse 74   Temp 98.5  F (36.9  C) (Oral)   Resp 16   Ht 1.626 m (5' 4\")   Wt 95.3 kg (210 lb)   LMP 04/20/2025 (Exact Date)   SpO2 97%   Breastfeeding No   BMI 36.05 " kg/m      PHYSICAL EXAM    Physical Exam  Vitals and nursing note reviewed.   Constitutional:       General: She is not in acute distress.     Appearance: Normal appearance. She is normal weight. She is not ill-appearing.   HENT:      Head: Normocephalic and atraumatic.      Nose: Nose normal.      Mouth/Throat:      Mouth: Mucous membranes are moist.   Eyes:      Extraocular Movements: Extraocular movements intact.      Conjunctiva/sclera: Conjunctivae normal.   Cardiovascular:      Rate and Rhythm: Normal rate.      Pulses: Normal pulses.   Pulmonary:      Effort: Pulmonary effort is normal.   Abdominal:      General: Abdomen is flat. There is no distension.      Palpations: Abdomen is soft.      Tenderness: There is abdominal tenderness (epigastric, minimally at RUQ and LUQ). There is no guarding or rebound.   Musculoskeletal:         General: Normal range of motion.      Cervical back: Normal range of motion.      Right lower leg: No edema.      Left lower leg: No edema.   Skin:     General: Skin is warm and dry.      Coloration: Skin is not jaundiced.   Neurological:      General: No focal deficit present.      Mental Status: She is alert and oriented to person, place, and time. Mental status is at baseline.   Psychiatric:         Mood and Affect: Mood normal.         Behavior: Behavior normal.         Thought Content: Thought content normal.         Judgment: Judgment normal.            LAB:  All pertinent labs reviewed and interpreted.  Results for orders placed or performed during the hospital encounter of 05/31/25   US Abdomen Limited (RUQ)    Impression    IMPRESSION:  1.  Hepatic steatosis.  2.  No cholelithiasis or sonographic evidence of acute cholecystitis.   Comprehensive metabolic panel   Result Value Ref Range    Sodium 138 135 - 145 mmol/L    Potassium 4.1 3.4 - 5.3 mmol/L    Carbon Dioxide (CO2) 27 22 - 29 mmol/L    Anion Gap 8 7 - 15 mmol/L    Urea Nitrogen 9.2 6.0 - 20.0 mg/dL    Creatinine 0.51  0.51 - 0.95 mg/dL    GFR Estimate >90 >60 mL/min/1.73m2    Calcium 9.1 8.8 - 10.4 mg/dL    Chloride 103 98 - 107 mmol/L    Glucose 129 (H) 70 - 99 mg/dL    Alkaline Phosphatase 60 40 - 150 U/L    AST 14 0 - 45 U/L    ALT 19 0 - 50 U/L    Protein Total 7.7 6.4 - 8.3 g/dL    Albumin 4.5 3.5 - 5.2 g/dL    Bilirubin Total 0.3 <=1.2 mg/dL   Result Value Ref Range    Lipase 17 13 - 60 U/L   HCG QUALitative pregnancy (blood)   Result Value Ref Range    hCG Serum Qualitative Negative Negative   CBC with platelets and differential   Result Value Ref Range    WBC Count 12.9 (H) 4.0 - 11.0 10e3/uL    RBC Count 4.73 3.80 - 5.20 10e6/uL    Hemoglobin 12.7 11.7 - 15.7 g/dL    Hematocrit 39.3 35.0 - 47.0 %    MCV 83 78 - 100 fL    MCH 26.8 26.5 - 33.0 pg    MCHC 32.3 31.5 - 36.5 g/dL    RDW 13.3 10.0 - 15.0 %    Platelet Count 396 150 - 450 10e3/uL    % Neutrophils 84 %    % Lymphocytes 12 %    % Monocytes 3 %    % Eosinophils 0 %    % Basophils 0 %    % Immature Granulocytes 0 %    NRBCs per 100 WBC 0 <1 /100    Absolute Neutrophils 10.8 (H) 1.6 - 8.3 10e3/uL    Absolute Lymphocytes 1.6 0.8 - 5.3 10e3/uL    Absolute Monocytes 0.4 0.0 - 1.3 10e3/uL    Absolute Eosinophils 0.0 0.0 - 0.7 10e3/uL    Absolute Basophils 0.0 0.0 - 0.2 10e3/uL    Absolute Immature Granulocytes 0.0 <=0.4 10e3/uL    Absolute NRBCs 0.0 10e3/uL       RADIOLOGY:  Reviewed all pertinent imaging. Please see official radiology report.  US Abdomen Limited (RUQ)   Preliminary Result   IMPRESSION:   1.  Hepatic steatosis.   2.  No cholelithiasis or sonographic evidence of acute cholecystitis.          PROCEDURES:   None      [facility] Documentation:   CMS Diagnoses: None             I, Marlo Fountain, am serving as a scribe to document services personally performed by Zenon Varela MD based on my observation and the provider's statements to me. I, Zenon Varela MD, attest that Marlo Fountain is acting in a scribe capacity, has observed  my performance of the services and has documented them in accordance with my direction.    Zenon Varela MD  [facility] EMERGENCY DEPARTMENT  [facility] [facility] [id number] [phone]       Zenon Varela MD  05/31/25 0318